# Patient Record
Sex: MALE | Race: WHITE | NOT HISPANIC OR LATINO | Employment: OTHER | URBAN - NONMETROPOLITAN AREA
[De-identification: names, ages, dates, MRNs, and addresses within clinical notes are randomized per-mention and may not be internally consistent; named-entity substitution may affect disease eponyms.]

---

## 2018-01-23 ENCOUNTER — OFFICE VISIT CONVERTED (OUTPATIENT)
Dept: FAMILY MEDICINE CLINIC | Age: 63
End: 2018-01-23
Attending: NURSE PRACTITIONER

## 2018-01-31 ENCOUNTER — OFFICE VISIT CONVERTED (OUTPATIENT)
Dept: FAMILY MEDICINE CLINIC | Age: 63
End: 2018-01-31
Attending: NURSE PRACTITIONER

## 2018-02-14 ENCOUNTER — OFFICE VISIT CONVERTED (OUTPATIENT)
Dept: FAMILY MEDICINE CLINIC | Age: 63
End: 2018-02-14
Attending: NURSE PRACTITIONER

## 2018-02-20 ENCOUNTER — OFFICE VISIT CONVERTED (OUTPATIENT)
Dept: SURGERY | Facility: CLINIC | Age: 63
End: 2018-02-20
Attending: SURGERY

## 2018-03-16 ENCOUNTER — OFFICE VISIT CONVERTED (OUTPATIENT)
Dept: SURGERY | Facility: CLINIC | Age: 63
End: 2018-03-16
Attending: SURGERY

## 2018-08-28 ENCOUNTER — OFFICE VISIT CONVERTED (OUTPATIENT)
Dept: FAMILY MEDICINE CLINIC | Age: 63
End: 2018-08-28
Attending: NURSE PRACTITIONER

## 2018-10-02 ENCOUNTER — OFFICE VISIT CONVERTED (OUTPATIENT)
Dept: FAMILY MEDICINE CLINIC | Age: 63
End: 2018-10-02
Attending: NURSE PRACTITIONER

## 2019-01-08 ENCOUNTER — OFFICE VISIT CONVERTED (OUTPATIENT)
Dept: FAMILY MEDICINE CLINIC | Age: 64
End: 2019-01-08
Attending: NURSE PRACTITIONER

## 2019-01-24 ENCOUNTER — OFFICE VISIT CONVERTED (OUTPATIENT)
Dept: FAMILY MEDICINE CLINIC | Age: 64
End: 2019-01-24
Attending: FAMILY MEDICINE

## 2019-05-14 ENCOUNTER — OFFICE VISIT CONVERTED (OUTPATIENT)
Dept: FAMILY MEDICINE CLINIC | Age: 64
End: 2019-05-14
Attending: NURSE PRACTITIONER

## 2019-05-18 ENCOUNTER — HOSPITAL ENCOUNTER (OUTPATIENT)
Dept: OTHER | Facility: HOSPITAL | Age: 64
Discharge: HOME | End: 2019-05-18
Attending: NURSE PRACTITIONER

## 2019-05-18 LAB
ALBUMIN SERPL-MCNC: 3.5 G/DL (ref 3.5–5)
ALBUMIN/GLOB SERPL: 1.1 {RATIO} (ref 1.4–2.6)
ALP SERPL-CCNC: 53 U/L (ref 56–155)
ALT SERPL-CCNC: 18 U/L (ref 10–40)
ANION GAP SERPL CALC-SCNC: 15 MMOL/L (ref 8–19)
AST SERPL-CCNC: 18 U/L (ref 15–50)
BILIRUB SERPL-MCNC: 0.62 MG/DL (ref 0.2–1.3)
BUN SERPL-MCNC: 11 MG/DL (ref 5–25)
BUN/CREAT SERPL: 14 {RATIO} (ref 6–20)
CALCIUM SERPL-MCNC: 9 MG/DL (ref 8.7–10.4)
CHLORIDE SERPL-SCNC: 105 MMOL/L (ref 99–111)
CHOLEST SERPL-MCNC: 130 MG/DL (ref 107–200)
CHOLEST/HDLC SERPL: 3 {RATIO} (ref 3–6)
CONV CO2: 24 MMOL/L (ref 22–32)
CONV TOTAL PROTEIN: 6.7 G/DL (ref 6.3–8.2)
CREAT UR-MCNC: 0.78 MG/DL (ref 0.7–1.2)
GFR SERPLBLD BASED ON 1.73 SQ M-ARVRAT: >60 ML/MIN/{1.73_M2}
GLOBULIN UR ELPH-MCNC: 3.2 G/DL (ref 2–3.5)
GLUCOSE SERPL-MCNC: 96 MG/DL (ref 70–99)
HDLC SERPL-MCNC: 44 MG/DL (ref 40–60)
LDLC SERPL CALC-MCNC: 71 MG/DL (ref 70–100)
OSMOLALITY SERPL CALC.SUM OF ELEC: 289 MOSM/KG (ref 273–304)
POTASSIUM SERPL-SCNC: 4.4 MMOL/L (ref 3.5–5.3)
SODIUM SERPL-SCNC: 140 MMOL/L (ref 135–147)
TRIGL SERPL-MCNC: 73 MG/DL (ref 40–150)
VLDLC SERPL-MCNC: 15 MG/DL (ref 5–37)

## 2019-11-26 ENCOUNTER — OFFICE VISIT CONVERTED (OUTPATIENT)
Dept: FAMILY MEDICINE CLINIC | Age: 64
End: 2019-11-26
Attending: NURSE PRACTITIONER

## 2019-11-29 ENCOUNTER — HOSPITAL ENCOUNTER (OUTPATIENT)
Dept: OTHER | Facility: HOSPITAL | Age: 64
Discharge: HOME | End: 2019-11-29
Attending: NURSE PRACTITIONER

## 2019-11-29 LAB
ALBUMIN SERPL-MCNC: 3.8 G/DL (ref 3.5–5)
ALBUMIN/GLOB SERPL: 1.1 {RATIO} (ref 1.4–2.6)
ALP SERPL-CCNC: 54 U/L (ref 56–155)
ALT SERPL-CCNC: 18 U/L (ref 10–40)
ANION GAP SERPL CALC-SCNC: 20 MMOL/L (ref 8–19)
AST SERPL-CCNC: 17 U/L (ref 15–50)
BILIRUB SERPL-MCNC: 0.28 MG/DL (ref 0.2–1.3)
BUN SERPL-MCNC: 12 MG/DL (ref 5–25)
BUN/CREAT SERPL: 16 {RATIO} (ref 6–20)
CALCIUM SERPL-MCNC: 9.6 MG/DL (ref 8.7–10.4)
CHLORIDE SERPL-SCNC: 105 MMOL/L (ref 99–111)
CHOLEST SERPL-MCNC: 138 MG/DL (ref 107–200)
CHOLEST/HDLC SERPL: 2.9 {RATIO} (ref 3–6)
CONV CO2: 18 MMOL/L (ref 22–32)
CONV TOTAL PROTEIN: 7.3 G/DL (ref 6.3–8.2)
CREAT UR-MCNC: 0.74 MG/DL (ref 0.7–1.2)
GFR SERPLBLD BASED ON 1.73 SQ M-ARVRAT: >60 ML/MIN/{1.73_M2}
GLOBULIN UR ELPH-MCNC: 3.5 G/DL (ref 2–3.5)
GLUCOSE SERPL-MCNC: 95 MG/DL (ref 70–99)
HDLC SERPL-MCNC: 48 MG/DL (ref 40–60)
LDLC SERPL CALC-MCNC: 73 MG/DL (ref 70–100)
OSMOLALITY SERPL CALC.SUM OF ELEC: 288 MOSM/KG (ref 273–304)
POTASSIUM SERPL-SCNC: 4.4 MMOL/L (ref 3.5–5.3)
PSA SERPL-MCNC: 0.19 NG/ML (ref 0–4)
SODIUM SERPL-SCNC: 139 MMOL/L (ref 135–147)
TRIGL SERPL-MCNC: 84 MG/DL (ref 40–150)
VLDLC SERPL-MCNC: 17 MG/DL (ref 5–37)

## 2020-04-06 ENCOUNTER — TELEMEDICINE CONVERTED (OUTPATIENT)
Dept: NEUROLOGY | Facility: CLINIC | Age: 65
End: 2020-04-06
Attending: NURSE PRACTITIONER

## 2020-04-20 ENCOUNTER — HOSPITAL ENCOUNTER (OUTPATIENT)
Dept: OTHER | Facility: HOSPITAL | Age: 65
Discharge: HOME | End: 2020-04-20
Attending: NURSE PRACTITIONER

## 2020-05-19 ENCOUNTER — OFFICE VISIT CONVERTED (OUTPATIENT)
Dept: NEUROLOGY | Facility: CLINIC | Age: 65
End: 2020-05-19
Attending: NURSE PRACTITIONER

## 2020-06-18 ENCOUNTER — OFFICE VISIT CONVERTED (OUTPATIENT)
Dept: FAMILY MEDICINE CLINIC | Age: 65
End: 2020-06-18
Attending: NURSE PRACTITIONER

## 2020-06-20 ENCOUNTER — HOSPITAL ENCOUNTER (OUTPATIENT)
Dept: OTHER | Facility: HOSPITAL | Age: 65
Discharge: HOME | End: 2020-06-20
Attending: NURSE PRACTITIONER

## 2020-06-20 LAB
BASOPHILS # BLD MANUAL: 0.06 10*3/UL (ref 0–0.2)
BASOPHILS NFR BLD MANUAL: 1.3 % (ref 0–3)
CHOLEST SERPL-MCNC: 114 MG/DL (ref 107–200)
CHOLEST/HDLC SERPL: 2.9 {RATIO} (ref 3–6)
DEPRECATED RDW RBC AUTO: 46.6 FL
EOSINOPHIL # BLD MANUAL: 0.21 10*3/UL (ref 0–0.7)
EOSINOPHIL NFR BLD MANUAL: 4.6 % (ref 0–7)
ERYTHROCYTE [DISTWIDTH] IN BLOOD BY AUTOMATED COUNT: 13.2 % (ref 11.5–14.5)
GRANS (ABSOLUTE): 1.69 10*3/UL (ref 2–8)
GRANS: 36.8 % (ref 30–85)
HBA1C MFR BLD: 14.1 G/DL (ref 14–18)
HCT VFR BLD AUTO: 42.2 % (ref 42–52)
HDLC SERPL-MCNC: 39 MG/DL (ref 40–60)
IMM GRANULOCYTES # BLD: 0.01 10*3/UL (ref 0–0.54)
IMM GRANULOCYTES NFR BLD: 0.2 % (ref 0–0.43)
LDLC SERPL CALC-MCNC: 64 MG/DL (ref 70–100)
LYMPHOCYTES # BLD MANUAL: 1.95 10*3/UL (ref 1–5)
LYMPHOCYTES NFR BLD MANUAL: 14.6 % (ref 3–10)
MCH RBC QN AUTO: 31.5 PG (ref 27–31)
MCHC RBC AUTO-ENTMCNC: 33.4 G/DL (ref 33–37)
MCV RBC AUTO: 94.4 FL (ref 80–96)
MONOCYTES # BLD AUTO: 0.67 10*3/UL (ref 0.2–1.2)
PLATELET # BLD AUTO: 169 10*3/UL (ref 130–400)
PMV BLD AUTO: 9 FL (ref 7.4–10.4)
RBC # BLD AUTO: 4.47 10*6/UL (ref 4.7–6.1)
TRIGL SERPL-MCNC: 54 MG/DL (ref 40–150)
VARIANT LYMPHS NFR BLD MANUAL: 42.5 % (ref 20–45)
VLDLC SERPL-MCNC: 11 MG/DL (ref 5–37)
WBC # BLD AUTO: 4.59 10*3/UL (ref 4.8–10.8)

## 2021-01-14 ENCOUNTER — OFFICE VISIT CONVERTED (OUTPATIENT)
Dept: FAMILY MEDICINE CLINIC | Age: 66
End: 2021-01-14
Attending: NURSE PRACTITIONER

## 2021-01-27 ENCOUNTER — HOSPITAL ENCOUNTER (OUTPATIENT)
Dept: OTHER | Facility: HOSPITAL | Age: 66
Discharge: HOME | End: 2021-01-27
Attending: NURSE PRACTITIONER

## 2021-01-27 ENCOUNTER — HOSPITAL ENCOUNTER (OUTPATIENT)
Dept: CARDIOLOGY | Facility: HOSPITAL | Age: 66
Discharge: HOME | End: 2021-01-27
Attending: SURGERY

## 2021-01-27 LAB
ALBUMIN SERPL-MCNC: 3.5 G/DL (ref 3.5–5)
ALBUMIN/GLOB SERPL: 1 {RATIO} (ref 1.4–2.6)
ALP SERPL-CCNC: 60 U/L (ref 56–155)
ALT SERPL-CCNC: 23 U/L (ref 10–40)
ANION GAP SERPL CALC-SCNC: 13 MMOL/L (ref 8–19)
AST SERPL-CCNC: 22 U/L (ref 15–50)
BILIRUB SERPL-MCNC: 0.34 MG/DL (ref 0.2–1.3)
BUN SERPL-MCNC: 10 MG/DL (ref 5–25)
BUN/CREAT SERPL: 11 {RATIO} (ref 6–20)
CALCIUM SERPL-MCNC: 9 MG/DL (ref 8.7–10.4)
CHLORIDE SERPL-SCNC: 104 MMOL/L (ref 99–111)
CHOLEST SERPL-MCNC: 147 MG/DL (ref 107–200)
CHOLEST/HDLC SERPL: 3.2 {RATIO} (ref 3–6)
CONV CO2: 24 MMOL/L (ref 22–32)
CONV TOTAL PROTEIN: 6.9 G/DL (ref 6.3–8.2)
CREAT UR-MCNC: 0.91 MG/DL (ref 0.7–1.2)
ERYTHROCYTE [DISTWIDTH] IN BLOOD BY AUTOMATED COUNT: 12.5 % (ref 11.5–14.5)
GFR SERPLBLD BASED ON 1.73 SQ M-ARVRAT: >60 ML/MIN/{1.73_M2}
GLOBULIN UR ELPH-MCNC: 3.4 G/DL (ref 2–3.5)
GLUCOSE SERPL-MCNC: 129 MG/DL (ref 70–99)
HBA1C MFR BLD: 15.5 G/DL (ref 14–18)
HCT VFR BLD AUTO: 46.1 % (ref 42–52)
HDLC SERPL-MCNC: 46 MG/DL (ref 40–60)
LDLC SERPL CALC-MCNC: 76 MG/DL (ref 70–100)
MCH RBC QN AUTO: 32 PG (ref 27–31)
MCHC RBC AUTO-ENTMCNC: 33.6 G/DL (ref 33–37)
MCV RBC AUTO: 95.1 FL (ref 80–96)
OSMOLALITY SERPL CALC.SUM OF ELEC: 285 MOSM/KG (ref 273–304)
PLATELET # BLD AUTO: 207 10*3/UL (ref 130–400)
PMV BLD AUTO: 9 FL (ref 7.4–10.4)
POTASSIUM SERPL-SCNC: 4.2 MMOL/L (ref 3.5–5.3)
RBC # BLD AUTO: 4.85 10*6/UL (ref 4.7–6.1)
SODIUM SERPL-SCNC: 137 MMOL/L (ref 135–147)
TRIGL SERPL-MCNC: 127 MG/DL (ref 40–150)
VLDLC SERPL-MCNC: 25 MG/DL (ref 5–37)
WBC # BLD AUTO: 6.97 10*3/UL (ref 4.8–10.8)

## 2021-01-29 LAB
EST. AVERAGE GLUCOSE BLD GHB EST-MCNC: 126 MG/DL
HBA1C MFR BLD: 6 % (ref 3.5–5.7)

## 2021-05-10 NOTE — H&P
"   History and Physical      Patient Name: Yemi Pabon   Patient ID: [id number]   Sex: Male   YOB: 1955    Primary Care Provider: Mervin Rodriguez MD   Referring Provider: Jyoti FOX    Visit Date: April 6, 2020    Provider: DOMINIQUE Louis   Location: [facility]   Location Address: [address] [place] [address]  [id number]   Location Phone: [phone]          Chief Complaint     hx of stroke       History Of Present Illness  Video Conferencing Visit  Yemi Pabon is a 64 year old /White male who is presenting for evaluation via video conferencing. Verbal consent obtained before beginning visit.   The following staff were present during this visit: Lizett Charles and Janae CERVANTES   Yemi Pabon is a 64 year old /White male who presents today to [facility] today referred from Jyoti FOX. Had a spell at work 3 weeks ago. [facility] and was referred to ER. Had pain in the top part of his head that lasted a little over 2 minutes, had some blurred vision, felt weak and had to sit down, hands were sweaty. Felt back to normal within 2 hours. Felt a numb feeling to head. Continues to have a \"fuzzy feeling\" in the top of his head. States other than that he is feeling good and back to normal.      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       Past Medical History  Blood clot in vein         Past Surgical History  Colonoscopy; EGD         Medication List  aspirin 81 mg oral tablet,delayed release (DR/EC); Claritin 10 mg oral tablet; fexofenadine 180 mg oral tablet; Lipitor 10 mg oral tablet; omeprazole 40 mg oral capsule,delayed release(DR/EC)         Allergy List  finasteride; Neosporin; Peroxide; phenobarbital; SULFA (SULFONAMIDES)         Family Medical " History  Coronary Artery Disease; Diabetes         Social History  ; Tobacco (Never)         Review of Systems  · Constitutional  o Denies  o : chills, excessive sweating, fatigue, fever, sycope/passing out, weight gain, weight loss  · Eyes  o Denies  o : changes in vision, blurry vision, double vision  · HENT  o Denies  o : loss of hearing, ringing in the ears, ear aches, sore throat, nasal congestion, sinus pain, nose bleeds, seasonal allergies  · Cardiovascular  o Denies  o : blood clots, swollen legs, anemia, easy burising or bleeding, transfusions  · Respiratory  o Denies  o : shortness of breath, dry cough, productive cough, pneumonia, COPD  · Gastrointestinal  o Denies  o : difficulty swallowing, reflux  · Genitourinary  o Denies  o : incontinence  · Neurologic  o Admits  o : stroke  o Denies  o : headache, seizure, tremor, loss of balance, falls, dizziness/vertigo, difficulty with sleep, numbness/tingling/paresthesia , difficulty with coordination, difficulty with dexterity, weakness  · Musculoskeletal  o Denies  o : neck stiffness/pain, swollen lymph nodes, muscle aches, joint pain, weakness, spasms, sciatica, pain radiating in arm, pain radiating in leg, low back pain  · Endocrine  o Denies  o : diabetes, thyroid disorder  · Psychiatric  o Denies  o : anxiety, depression      Physical Examination  · Constitutional  o Appearance  o : well-nourished, well groomed, in no apparent distress  · Eyes  o Pupils and Irises  o : Pupils equal, round  · Cardiovascular  o Peripheral Vascular System  o :   § Extremities  § : No peripheral edema was appreciated  · Musculoskeletal  o General  o : Normal bulk and normal tone throughout. Normal gait and station.  · Neurologic  o Mental Status Examination  o :   § Orientation  § : Alert and oriented to person, place, and time,  § Speech/Language  § : Intact naming, comprehension, and repetition. No dysarthria.  § Memory  § : Immediate recall is 3/3. Recall at 5  minutes is 3/3.   § Attention  § : Attention span is intact to serial 7 examination and finger tapping.   § Fund of Knowledge  § : Adequate fund of knowledge  o Cranial Nerves  o : Pupils are equal, round and reactive to light. Extraocular movements are intact. Visual fields are full. Sensation in the V1-V3 distribution is intact and symmetric. Muscles of mastication are strong and symmetric. Muscles of facial expression are strong and symmetric. Hearing is intact. Palatal raise is intact and symmetric. Uvula is midline. Shoulder shrug is strong. Tongue protrudes in the midline.  o Sensation  o : Intact sensation to light touch  o Gait and Station  o :   § Gait Screening  § : Normal, narrow based gait, with a normal arm swing.  o Coordination  o : Intact finger to nose and heel to shin. Rapid alternating movements are intact in the upper and lower extremities.          Assessment  · TIA (transient ischemic attack)     435.9/G45.9  Will recheck CT head due to him receiving initial CT head within the first 12 hours of symptom onset. He is unable to have MRI. Will rule out CVA with repeat CT head. Continue Lipitor and aspirin for vascular risk reduction. Reviewed CTA head/neck, CT head, echo results. All unremarkable. Discussed signs and symptoms of stroke and the importance of going to the ER immediately at the onset of those symptoms.     Problems Reconciled  Plan  · Orders  o CT Head/Brain without IV Contrast [facility] ([id number]) - 435.9/G45.9 - 04/06/2020  · Medications  o Medications have been Reconciled  o Transition of Care or Provider Policy  · Instructions  o Encouraged to follow-up with Primary Care Provider for preventative care.  o Call or Return if symptoms worsen or persist.   o Follow Up in 2 weeks.            Electronically Signed by: DOMINIQUE Louis -Author on April 6, 2020 04:16:00 PM

## 2021-05-13 NOTE — PROGRESS NOTES
"   Progress Note      Patient Name: Yemi Pabon   Patient ID: [id number]   Sex: Male   YOB: 1955    Primary Care Provider: Mervin Rodriguez MD   Referring Provider: Jyoti FOX    Visit Date: May 19, 2020    Provider: DOMINIQUE Louis   Location: [facility]   Location Address: [address] [place] [address]  [id number]   Location Phone: [phone]          Chief Complaint  · Follow Up Exam      History Of Present Illness  Video Conferencing Visit  Yemi Pabon is a 64 year old /White male who is presenting for evaluation via video conferencing. Verbal consent obtained before beginning visit.   Yemi Pabon is a 64 year old /White male who presents today to [facility] today referred from Jyoti FOX. Had a spell at work 3 weeks ago. Went to [facility] in clinic and was referred to ER. Had pain in the top part of his head that lasted a little over 2 minutes, had some blurred vision, felt weak and had to sit down, hands were sweaty. Felt back to normal within 2 hours. Felt a numb feeling to head. Continues to have a \"fuzzy feeling\" in the top of his head. States other than that he is feeling good and back to normal.   Yemi Pabon is a 64 year old /White male who presents today to [facility] today for a follow up exam. Denies recurrent stroke symptoms. States he has occipital headache when he keeps his head bent over too much. 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 Repeat CT head is unrevealing for stroke.      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       Past Medical History  Blood clot in vein         Past Surgical History  Colonoscopy; EGD         Medication List  aspirin 81 mg oral tablet,delayed release (DR/EC); Claritin 10 mg oral " "tablet; fexofenadine 180 mg oral tablet; Lipitor 10 mg oral tablet; omeprazole 40 mg oral capsule,delayed release(DR/EC)         Allergy List  finasteride; Neosporin; Peroxide; phenobarbital; SULFA (SULFONAMIDES)         Family Medical History  Coronary Artery Disease; Diabetes         Social History  ; Tobacco (Never)         Review of Systems  · Constitutional  o Denies  o : chills, excessive sweating, fatigue, fever, sycope/passing out, weight gain, weight loss  · Eyes  o Denies  o : changes in vision, blurry vision, double vision  · HENT  o Denies  o : loss of hearing, ringing in the ears, ear aches, sore throat, nasal congestion, sinus pain, nose bleeds, seasonal allergies  · Cardiovascular  o Denies  o : blood clots, swollen legs, anemia, easy burising or bleeding, transfusions  · Respiratory  o Denies  o : shortness of breath, dry cough, productive cough, pneumonia, COPD  · Gastrointestinal  o Denies  o : difficulty swallowing, reflux  · Genitourinary  o Denies  o : incontinence  · Neurologic  o Denies  o : headache, seizure, stroke, tremor, loss of balance, falls, dizziness/vertigo, difficulty with sleep, numbness/tingling/paresthesia , difficulty with coordination, difficulty with dexterity, weakness  · Musculoskeletal  o Denies  o : neck stiffness/pain, swollen lymph nodes, muscle aches, joint pain, weakness, spasms, sciatica, pain radiating in arm, pain radiating in leg, low back pain  · Endocrine  o Denies  o : diabetes, thyroid disorder  · Psychiatric  o Denies  o : anxiety, depression      Vitals  Date Time BP Position Site L\R Cuff Size HR RR TEMP (F) WT  HT  BMI kg/m2 BSA m2 O2 Sat        05/19/2020 02:39 /63 Sitting    76 - R  98 279lbs 8oz 5'  11\" 38.98 2.52           Physical Examination  · Constitutional  o Appearance  o : well-nourished, well groomed, in no apparent distress  · Eyes  o Pupils and Irises  o : Pupils equal, round  · Cardiovascular  o Peripheral Vascular System  o : "   § Extremities  § : No peripheral edema was appreciated  · Musculoskeletal  o General  o : Normal bulk and normal tone throughout. Normal gait and station.  · Neurologic  o Mental Status Examination  o :   § Orientation  § : Alert and oriented to person, place, and time,  § Speech/Language  § : Intact naming, comprehension, and repetition. No dysarthria.  § Memory  § : Immediate recall is 3/3. Recall at 5 minutes is 3/3.   § Attention  § : Attention span is intact to serial 7 examination and finger tapping.   § Fund of Knowledge  § : Adequate fund of knowledge  o Cranial Nerves  o : Pupils are equal, round and reactive to light. Extraocular movements are intact. Visual fields are full. Sensation in the V1-V3 distribution is intact and symmetric. Muscles of mastication are strong and symmetric. Muscles of facial expression are strong and symmetric. Hearing is intact. Palatal raise is intact and symmetric. Uvula is midline. Shoulder shrug is strong. Tongue protrudes in the midline.  o Motor Examination  o :   § RUE Strength  § : strength normal  § LUE Strength  § : strength normal  § RLE Strength  § : strength normal  § LLE Strength  § : strength normal  o Reflexes  o : diffuse hyporeflexia present   o Sensation  o : Intact sensation to light touch  o Gait and Station  o :   § Gait Screening  § : Normal, narrow based gait, with a normal arm swing.  o Cerebellar Function  o : intact finger to nose and heel to shin. Rapid alternating movements are intact in the upper and lower extremities.   o Coordination  o : Intact finger to nose and heel to shin. Rapid alternating movements are intact in the upper and lower extremities.          Assessment  · TIA (transient ischemic attack)     435.9/G45.9  Continue Lipitor and aspirin for vascular risk reduction. Reviewed repeat CT head results, no stroke noted. Discussed signs and symptoms of stroke and the importance of going to the ER immediately at the onset of those symptoms.      Problems Reconciled  Plan  · Medications  o Medications have been Reconciled  o Transition of Care or Provider Policy  · Instructions  o Call or Return if symptoms worsen or persist.   o Follow up in 6 months.            Electronically Signed by: DOMINIQUE Louis -Author on May 20, 2020 01:54:08 PM

## 2021-05-15 VITALS
WEIGHT: 279.5 LBS | HEIGHT: 71 IN | DIASTOLIC BLOOD PRESSURE: 63 MMHG | BODY MASS INDEX: 39.13 KG/M2 | TEMPERATURE: 98 F | SYSTOLIC BLOOD PRESSURE: 143 MMHG | HEART RATE: 76 BPM

## 2021-05-16 VITALS — WEIGHT: 260 LBS | BODY MASS INDEX: 36.4 KG/M2 | HEIGHT: 71 IN | RESPIRATION RATE: 16 BRPM

## 2021-05-16 VITALS — BODY MASS INDEX: 36.89 KG/M2 | WEIGHT: 263.5 LBS | HEIGHT: 71 IN

## 2021-05-18 NOTE — PROGRESS NOTES
Yemi Pabon PAMELA 1955     Office/Outpatient Visit    Visit Date: Tue, Jan 23, 2018 09:53 am    Provider: Vahe Jones N.P. (Assistant: Belinda Mcmillan MA)    Location: [facility]        Electronically signed by Vahe Jones N.P. on  01/23/2018 10:42:21 AM                             SUBJECTIVE:        CC:     Fabian is a 62 year old White male.  Sinuses         HPI:         Acute sinusitis, other noted.  This has been a problem for the past 3 days.  The pattern of symptoms is described as episodic, with unpredictable symptomatic periods.  His primary symptoms include facial pressure, subjective fever,  headache, nasal congestion and post-nasal drip.  He denies chest congestion or cough.  He has already tried an antihistamine and guaifenesin.  Medical history is pertinent for allergies and frequent sinusitis.  teeth pain and foul nasal odor     ROS:     CONSTITUTIONAL:  Negative for chills, fatigue, fever and weight change.      E/N/T:  Positive for nasal congestion and sinus pressure.   Negative for sore throat.      CARDIOVASCULAR:  Negative for chest pain, orthopnea, paroxysmal nocturnal dyspnea and pedal edema.      RESPIRATORY:  Negative for dyspnea and cough.      GASTROINTESTINAL:  Negative for abdominal pain, heartburn, constipation, diarrhea, and stool changes.          PMH/FMH/SH:     Last Reviewed on 1/23/2018 10:41 AM by Vahe Jones    Past Medical History:         Fracture(s): left foot fx;         PREVENTIVE HEALTH MAINTENANCE             COLONOSCOPY: Done within the last 10 years was last done 1/2008 with normal results The next one is due  at age 65     INFLUENZA VACCINE: declines, understands reason for immunization         Surgical History:     NONE         Family History:         Positive for Coronary Artery Disease ( father ).      Positive for Type 2 Diabetes ( mother ).          Social History:     Occupation: a factory     Marital Status:       Children: 3 children     ALONSO denies any current form of exercise.          Tobacco/Alcohol/Supplements:     Last Reviewed on 1/23/2018 10:41 AM by Vahe Jones    Tobacco: He has never smoked.  Non-drinker     Caffeine:  He admits to consuming caffeine via coffee ( 3 servings per day ) and tea ( 1 serving per day ).              Current Problems:     Last Reviewed on 1/23/2018 10:41 AM by Vahe Jones    GERD     Hypercholesterolemia     Fatigue     History of pulmonary embolism     Thyromegaly     Acute sinusitis, other     Upper respiratory infection         Immunizations:     Influenza Virus Vaccine, unspecified formulation 2/14/2017         Allergies:     Last Reviewed on 1/23/2018 10:41 AM by Vahe Jones    Sulfonamides:    Phenobarbital:    Neosporin:    hydrogen peroxide:        Current Medications:     Last Reviewed on 1/23/2018 10:41 AM by Vahe Jones    Allegra Allergy 24 Hour 180mg Tablet Take 1 tablet(s) by mouth daily     Lipitor 10mg Tablet Take 1 tablet(s) by mouth daily     Omeprazole 40mg Capsules, Extended Release Take 1 capsule(s) by mouth daily     Aspirin (ASA) 81mg Tablets, Enteric Coated 1 tab daily     Erythromycin Base 333mg Tablets, Enteric Coated Take 1 tablet(s) by mouth q8h         OBJECTIVE:        Vitals:         Current: 1/23/2018 9:56:21 AM    Ht:  5 ft, 10.75 in;  Wt: 256 lbs;  BMI: 36.0    T: 98.3 F (oral);  BP: 144/96 mm Hg (left arm, sitting);  P: 86 bpm (left arm (BP Cuff), sitting);  sCr: 0.89 mg/dL;  GFR: 97.71        Repeat:     10:18:22 AM     BP:   135/85mm Hg (left arm, sitting)         Exams:     PHYSICAL EXAM:     GENERAL: Vitals recorded well developed, well nourished;  well groomed;  no apparent distress;     E/N/T: NOSE: bilateral maxillary sinus tenderness present;     RESPIRATORY: normal respiratory rate and pattern with no distress; normal breath sounds with no rales, rhonchi, wheezes or rubs;     CARDIOVASCULAR: normal rate; rhythm is  regular;  normal S1; normal S2; no systolic murmur; no cyanosis; no edema;     GASTROINTESTINAL: nontender, nondistended; no hepatosplenomegaly or masses; no bruits;         ASSESSMENT:           461.8   J01.90  Acute sinusitis, other              DDx:         ORDERS:         Meds Prescribed:       Fluticasone Propionate 50mcg/1actuation Nasal Spray 1 spray each nostil daily  #1 (One) gm Refills: 2       Refill of: Erythromycin Base 333mg Tablets, Enteric Coated Take 1 tablet(s) by mouth q8h  #30 (Thirty) tablet(s) Refills: 0                 PLAN:          Acute sinusitis, other         FOLLOW-UP: Advised to call if there is no improvement Follow-up by phone if no improvement in 1 week..   Schedule follow-up appointments on a p.r.n. basis..            Prescriptions:       Fluticasone Propionate 50mcg/1actuation Nasal Spray 1 spray each nostil daily  #1 (One) gm Refills: 2       Refill of: Erythromycin Base 333mg Tablets, Enteric Coated Take 1 tablet(s) by mouth q8h  #30 (Thirty) tablet(s) Refills: 0             Patient Recommendations:        For  Acute sinusitis, other:     Follow-up by phone if no improvement in 1 week. Schedule follow-up appointments as needed.                APPOINTMENT INFORMATION:        Monday Tuesday Wednesday Thursday Friday Saturday Sunday            Time:___________________AM  PM   Date:_____________________             CHARGE CAPTURE:           Primary Diagnosis:     461.8 Acute sinusitis, other            J01.90    Acute sinusitis, unspecified              Orders:          [id number]   Office/outpatient visit; established patient, level 3  (In-House)

## 2021-05-18 NOTE — PROGRESS NOTES
Yemi Pabon 1955     Office/Outpatient Visit    Visit Date: Tue, Aug 28, 2018 05:51 pm    Provider: Jyoti Mcduffie N.P. (Assistant: Sarah Spurling, MA)    Location: [facility]        Electronically signed by Jyoti Mcduffie N.P. on  08/29/2018 09:39:17 PM                             SUBJECTIVE:        CC:     Fabian is a 62 year old White male.  This is a follow-up visit.          HPI:         Patient to be evaluated for hypercholesterolemia.  Current treatment includes Lipitor and a low cholesterol/low fat diet.  Compliance with treatment has been good; he takes his medication as directed.  He denies experiencing any hypercholesterolemia related symptoms.          he states bp is consistently 130s over 80s or lower.          GERD details; stable on omeprazole.  denies side effects.  requests refills.  EGD feb 2018- erosive gastritis.  feels that removing stomach polyp improved symptoms.      ROS:     CONSTITUTIONAL:  Negative for chills, fatigue, fever, and weight change.      CARDIOVASCULAR:  Negative for chest pain, palpitations, tachycardia, orthopnea, and edema.      RESPIRATORY:  Negative for cough, dyspnea, and hemoptysis.      GASTROINTESTINAL:  Positive for acid reflux symptoms ( stable ).   Negative for constipation, diarrhea, hematochezia, nausea or vomiting.      GENITOURINARY:  Negative for dysuria, genital lesions, hematuria, impotence, polyuria, and changes in urine stream.      MUSCULOSKELETAL:  Negative for arthralgias, back pain, and myalgias.      NEUROLOGICAL:  Negative for dizziness, headaches, paresthesias, and weakness.      ENDOCRINE:  Negative for hair loss, heat/cold intolerance, polydipsia, and polyphagia.      PSYCHIATRIC:  Negative for anxiety, depression, and sleep disturbances.          PMH/FMH/SH:     Last Reviewed on 1/23/2018 10:41 AM by Vahe Jones    Past Medical History:         Fracture(s): left foot fx;         PREVENTIVE HEALTH MAINTENANCE          EGD- feb 2018- stomach polyps , erosive gastritis     COLORECTAL CANCER SCREENING: Up to date (colonoscopy q10y; sigmoidoscopy q5y; Cologuard q3y) was last done 03/2018, Results are in chart; colonoscopy with normal results         Surgical History:     NONE         Family History:         Positive for Coronary Artery Disease ( father ).      Positive for Type 2 Diabetes ( mother ).          Social History:     Occupation: a factory     Marital Status:      Children: 3 children     ALONSO denies any current form of exercise.          Tobacco/Alcohol/Supplements:     Last Reviewed on 2/14/2018 03:07 PM by Cristina Perez    Tobacco: He has never smoked.  Non-drinker     Caffeine:  He admits to consuming caffeine via coffee ( 3 servings per day ) and tea ( 1 serving per day ).          Substance Abuse History:     Last Reviewed on 1/23/2018 10:41 AM by Vahe Jones    NEGATIVE         Mental Health History:     Last Reviewed on 1/23/2018 10:41 AM by Vahe Jones        Communicable Diseases (eg STDs):     Last Reviewed on 1/23/2018 10:41 AM by Vahe Jones            Current Problems:     Last Reviewed on 1/23/2018 10:41 AM by Vahe Jones    Vomiting     GERD     Hypercholesterolemia     Fatigue     History of pulmonary embolism     Thyromegaly         Immunizations:     Influenza Virus Vaccine, unspecified formulation 2/14/2017         Allergies:     Last Reviewed on 2/14/2018 03:07 PM by Cristina Perez    Sulfonamides:    Phenobarbital:    Neosporin:    hydrogen peroxide:        Current Medications:     Last Reviewed on 8/28/2018 05:57 PM by Spurling, Sarah C    Lipitor 10mg Tablet Take 1 tablet(s) by mouth daily     Allegra Allergy 24 Hour 180mg Tablet Take 1 tablet(s) by mouth daily     Omeprazole 40mg Capsules, Extended Release Take 1 capsule(s) by mouth daily     Aspirin (ASA) 81mg Tablets, Enteric Coated 1 tab daily         OBJECTIVE:        Vitals:         Historical:      02/14/2018  BP:   130/72 mm Hg ( (left arm, , sitting, );)     02/14/2018  Wt:   256.1lbs        Current: 8/28/2018 5:59:59 PM    Ht:  5 ft, 10.75 in;  Wt: 270.8 lbs;  BMI: 38.0    T: 98.3 F (oral);  BP: 181/82 mm Hg (right arm, sitting);  P: 86 bpm (right arm (BP Cuff), sitting);  sCr: 0.65 mg/dL;  GFR: 137.03        Repeat:     6:32:01 PM     BP:   138/80mm Hg (left arm, sitting)         Exams:     PHYSICAL EXAM:     GENERAL: no apparent distress;     NECK: thyroid is non-palpable;     RESPIRATORY: normal respiratory rate and pattern with no distress; normal breath sounds with no rales, rhonchi, wheezes or rubs;     CARDIOVASCULAR: normal rate; rhythm is regular;     MUSCULOSKELETAL:  Normal range of motion, strength and tone;     NEUROLOGIC: mental status: alert and oriented x 3; GROSSLY INTACT     PSYCHIATRIC:  appropriate affect and demeanor; normal speech pattern; grossly normal memory;         ASSESSMENT           272.0   E78.4  Hypercholesterolemia              DDx:     796.2   R03.0  Blood pressure elevation without a diagnosis of HTN              DDx:     530.81   K21.9  GERD              DDx:     477.9   J30.1  Allergies              DDx:         ORDERS:         Meds Prescribed:       Refill of: Omeprazole 40mg Capsules, Extended Release Take 1 capsule(s) by mouth daily  #90 (Ninety) capsule(s) Refills: 1       Refill of: Lipitor (Atorvastatin Calcium) 10mg Tablet Take 1 tablet(s) by mouth daily  #90 (Ninety) tablet(s) Refills: 1       Refill of: Allegra Allergy 24 Hour (Fexofenadine HCl) 180mg Tablet Take 1 tablet(s) by mouth daily  #90 (Ninety) tablet(s) Refills: 1         Lab Orders:       [id number]  HTN - [facility] CMP AND LIPID: [id number]  (Send-Out)                   PLAN: [facility] 2016 ENT dr echavarria 2007          Hypercholesterolemia     LABORATORY:  Labs ordered to be performed today include HTN/Lipid Panel: CMP, Lipid.      RECOMMENDATIONS given include: exercise and low cholesterol/low fat diet.             Prescriptions:       Refill of: Lipitor (Atorvastatin Calcium) 10mg Tablet Take 1 tablet(s) by mouth daily  #90 (Ninety) tablet(s) Refills: 1           Orders:       [id number]  HTN - [facility] CMP AND LIPID: [id number]  (Send-Out)            Blood pressure elevation without a diagnosis of HTN         RECOMMENDATIONS given include: monitor bp at home.  follow up for readings 140/90 or higher..           GERD         RECOMMENDATIONS given include: discontinuation of NSAIDs and aspirin, discontinuation (or at least limitation) of alcohol, avoidance of spicy foods, monitor kidney function., and reduce caffiene.            Prescriptions:       Refill of: Omeprazole 40mg Capsules, Extended Release Take 1 capsule(s) by mouth daily  #90 (Ninety) capsule(s) Refills: 1          Allergies           Prescriptions:       Refill of: Allegra Allergy 24 Hour (Fexofenadine HCl) 180mg Tablet Take 1 tablet(s) by mouth daily  #90 (Ninety) tablet(s) Refills: 1             Patient Recommendations:        For  Hypercholesterolemia:     Maintain a regular exercise program. Reduce the amount of cholesterol and saturated fat in your diet.          For  GERD:     Don't take aspirin or anti-inflammatory medications such as ibuprofen and naproxen, as these will further irritate your stomach.  (Tylenol is okay.) Avoid alcohol. Avoid spicy foods if they tend to make your symptoms worse.              CHARGE CAPTURE           **Please note: ICD descriptions below are intended for billing purposes only and may not represent clinical diagnoses**        Primary Diagnosis:         272.0 Hypercholesterolemia            E78.4    Other hyperlipidemia              Orders:          [id number]   Office/outpatient visit; established patient, level 3  (In-House)           796.2 Blood pressure elevation without a diagnosis of HTN            R03.0    Elevated blood-pressure reading, without diagnosis of hypertension    530.81 GERD            K21.9    Gastro-esophageal  reflux disease without esophagitis    477.9 Allergies            J30.1    Allergic rhinitis due to pollen

## 2021-05-18 NOTE — PROGRESS NOTES
Yemi PabonNavneet 1955     Office/Outpatient Visit    Visit Date: Tue, Jan 8, 2019 02:42 pm    Provider: Belia Vera N.P. (Assistant: Trish Rodriguez MA)    Location: [facility]        Electronically signed by Belia Vera N.P. on  01/09/2019 09:08:56 AM                             SUBJECTIVE:        CC: Pt also seen by COLTON Warren NP Student     Fabian is a 63 year old White male.  presents today due to complaints of drainage, cough, sinus pressure X 4 days         HPI:         Acute upper respiratory infection of multiple sites noted.  Reports L sinus pain and pressure that has worsened since Sunday;  States he has been taking his allergy medication daily in addition to a decongestant; however, he reports symptoms are worsening;     ROS:     CONSTITUTIONAL:  Negative for chills, fatigue, fever and weight change.      E/N/T:  Positive for ear pain, nasal congestion, frequent rhinorrhea, sinus pressure and sore throat ( acute ).      CARDIOVASCULAR:  Negative for chest pain, orthopnea, paroxysmal nocturnal dyspnea and pedal edema.      RESPIRATORY:  Negative for dyspnea and cough.      GASTROINTESTINAL:  Negative for abdominal pain, heartburn, constipation, diarrhea, and stool changes.      PSYCHIATRIC:  Negative for anxiety and depression.          PMH/FMH/SH:     Last Reviewed on 1/08/2019 03:20 PM by Belia Vera    Past Medical History:             PAST MEDICAL HISTORY     normal heart echo 2017     Fracture(s): left foot fx;     Hospitalizations: bilateral PE, 5-31-16 [facility] MAINTENANCE         EGD- feb 2018- stomach polyps , erosive gastritis     COLORECTAL CANCER SCREENING: Up to date (colonoscopy q10y; sigmoidoscopy q5y; Cologuard q3y) was last done 03/2018, Results are in chart; colonoscopy with normal results         Surgical History:     NONE         Family History:         Positive for Coronary Artery Disease ( father ).      Positive for Type 2  Diabetes ( mother ).          Social History:     Occupation: a factory     Marital Status:      Children: 3 children     ALONSO denies any current form of exercise.          Tobacco/Alcohol/Supplements:     Last Reviewed on 1/08/2019 03:20 PM by Belia Vera    Tobacco: He has never smoked.  Non-drinker     Caffeine:  He admits to consuming caffeine via coffee ( 3 servings per day ) and tea ( 1 serving per day ).          Substance Abuse History:     Last Reviewed on 1/08/2019 03:20 PM by Belia Vera    NEGATIVE         Mental Health History:     Last Reviewed on 1/08/2019 03:20 PM by Belia Vera        Communicable Diseases (eg STDs):     Last Reviewed on 1/08/2019 03:20 PM by Belia Vera            Current Problems:     Last Reviewed on 1/08/2019 03:20 PM by Belia Vera    Vomiting     GERD     Hypercholesterolemia     Fatigue     History of pulmonary embolism     Thyromegaly     Acute frontal sinusitis         Immunizations:     Influenza Virus Vaccine, unspecified formulation 2/14/2017     Tdap (Tetanus, reduced diph, acellular pertussis) 9/24/2018         Allergies:     Last Reviewed on 1/08/2019 03:20 PM by Belia Vera    Sulfonamides:    Phenobarbital:    Neosporin:        Current Medications:     Last Reviewed on 1/08/2019 03:20 PM by Belia Vera    Allegra Allergy 24 Hour 180mg Tablet Take 1 tablet(s) by mouth daily     Lipitor 10mg Tablet Take 1 tablet(s) by mouth daily     Omeprazole 40mg Capsules, Extended Release Take 1 capsule(s) by mouth daily     Aspirin (ASA) 81mg Tablets, Enteric Coated 1 tab daily         OBJECTIVE:        Vitals:         Current: 1/8/2019 2:48:15 PM    Ht:  5 ft, 10.75 in;  Wt: 269.6 lbs;  BMI: 37.9    T: 98.1 F (oral);  BP: 164/74 mm Hg (left arm, sitting);  P: 78 bpm (right arm (BP Cuff), sitting);  sCr: 0.75 mg/dL;  GFR: 117.06        Exams:     PHYSICAL EXAM:     GENERAL: Vitals recorded well developed, well nourished;  well  groomed;  no apparent distress;     E/N/T: EARS: external auditory canal normal on the left and occluded by cerumen on the right;  NOSE: turbinates are mildly swollen on the left;  left maxillary and bilateral frontal sinus tenderness present; OROPHARYNX: oral mucosa is normal;     RESPIRATORY: normal respiratory rate and pattern with no distress; normal breath sounds with no rales, rhonchi, wheezes or rubs;     CARDIOVASCULAR: normal rate; rhythm is regular;  normal S1; normal S2; no systolic murmur; no cyanosis; no edema;     GASTROINTESTINAL: nontender, nondistended; no hepatosplenomegaly or masses; no bruits;     SKIN:     MUSCULOSKELETAL:  Normal range of motion, strength and tone;     NEUROLOGICAL:  cranial nerves, motor and sensory function, reflexes, gait and coordination are all intact;     PSYCHIATRIC:  appropriate affect and demeanor; normal speech pattern; grossly normal memory;         ASSESSMENT:           461.1   J01.10  Acute frontal sinusitis              DDx:         ORDERS:         Meds Prescribed:       Augmentin (Amoxicillin/Clavulanate) 875mg/125mg Tablet 1 tab bid X 10 days  #20 (Twenty) tablet(s) Refills: 0                 PLAN:          Acute frontal sinusitis         FOLLOW-UP: Advised to call if there is no improvement..   for Annual Checkup School/Work Excuse for Today           Prescriptions:       Augmentin (Amoxicillin/Clavulanate) 875mg/125mg Tablet 1 tab bid X 10 days  #20 (Twenty) tablet(s) Refills: 0           Patient Education Handouts:       Sinus Infection (Sinusitis)              Patient Recommendations:        For  Acute frontal sinusitis:                     APPOINTMENT INFORMATION:        Monday Tuesday Wednesday Thursday Friday Saturday Sunday            Time:___________________AM  PM   Date:_____________________             CHARGE CAPTURE:           Primary Diagnosis:     461.1 Acute frontal sinusitis            J01.10    Acute frontal sinusitis, unspecified               Orders:          [id number]   Office/outpatient visit; established patient, level 3  (In-House)

## 2021-05-18 NOTE — PROGRESS NOTES
Yemi Pabon  1955     Office/Outpatient Visit    Visit Date: Thu, Jun 18, 2020 04:26 pm    Provider: Jyoti Mcduffie N.P. (Assistant: Spurling, Sarah C, MA)    Location: [facility]        Electronically signed by Jyoti Mcduffie N.P. on  06/30/2020 09:32:17 PM                             Subjective:        CC: Fabian is a 64 year old White male.  This is a follow-up visit.          HPI:           In regard to the gERD, gERD details; stable on omeprazole.  denies side effects.  requests refills.  EGD feb 2018- erosive gastritis.  feels that removing stomach polyp improved symptoms.            Additionally, he presents with history of mixed hyperlipidemia.  with regard to the hypercholesterolemia, current treatment includes Lipitor and a low cholesterol/low fat diet.  Compliance with treatment has been good; he takes his medication as directed.  He denies experiencing any hypercholesterolemia related symptoms.            wbc 14.6 at [facility] 3- when seen for dizziness.  dx with TIA.  ct and cta not concerning.  unable to have MRI due to many yrs as .  repeat ct of head per dr wright showed no concerns per pt.  he states he feels well and has had no further concerns.      ROS:     CONSTITUTIONAL:  Negative for chills, fatigue, fever, and weight change.      CARDIOVASCULAR:  Positive for pedal edema.   Negative for chest pain, dizziness or palpitations.      RESPIRATORY:  Negative for cough, dyspnea, and hemoptysis.      GASTROINTESTINAL:  Negative for abdominal pain, heartburn, constipation, diarrhea, and stool changes.      GENITOURINARY:  Negative for dysuria, genital lesions, hematuria, impotence, polyuria, and changes in urine stream.      MUSCULOSKELETAL:  Negative for arthralgias, limb pain and myalgias.      NEUROLOGICAL:  Negative for dizziness, headaches, paresthesias, and weakness.      ENDOCRINE:  Negative for hair loss, heat/cold intolerance, polydipsia, and  polyphagia.      PSYCHIATRIC:  Negative for anxiety, depression, and sleep disturbances.          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      Positive for Type 2 Diabetes ( mother ).          Social History:     Occupation: a factory     Marital Status:      Children: 3 children     ALONSO denies any current form of exercise.          Tobacco/Alcohol/Supplements:     Last Reviewed on 6/18/2020 04:26 PM by Spurling, Sarah C    Tobacco: He has never smoked.  Non-drinker     Caffeine:  He admits to consuming caffeine via coffee ( 3 servings per day ) and tea ( 1 serving per day ).          Substance Abuse History:     Last Reviewed on 1/24/2019 09:57 AM by Cristina Perez    NEGATIVE         Mental Health History:     Last Reviewed on 1/24/2019 09:57 AM by Cristina Perez        Communicable Diseases (eg STDs):     Last Reviewed on 1/24/2019 09:57 AM by Cristina Perez        Current Problems:     Last Reviewed on 6/18/2020 04:26 PM by Spurling, Sarah C    Other specified nontoxic goiter    Gastro-esophageal reflux disease without esophagitis    Personal history of pulmonary embolism    Other fatigue    Mixed hyperlipidemia    Vomiting, unspecified    Encounter for screening for malignant neoplasm  of prostate        Immunizations:     Influenza Virus Vaccine, unspecified formulation 2/14/2017    Tdap (Tetanus, reduced diph, acellular pertussis) 9/24/2018        Allergies:     Last Reviewed on 6/18/2020 04:26 PM by Spurling, Sarah C    Sulfonamides:      Phenobarbital:      Neosporin:      hydrogen peroxide:          Current Medications:     Last Reviewed on 6/18/2020 04:31 PM by Spurling, Sarah C    omeprazole 40 mg oral capsule,delayed release (enteric coated) [TAKE ONE CAPSULE BY MOUTH DAILY]    Allegra Allergy 180 mg oral tablet [Take 1 tablet(s) by mouth daily]    atorvastatin 10 mg oral tablet [TAKE ONE TABLET BY MOUTH DAILY]    aspirin 81 mg oral tablet, delayed release (enteric coated) [1 tab daily]    COQ10 Q DAY     VITAMIN B COMPLEX     VITAMIN D     Sudafed PRN         Objective:        Vitals:         Historical:     11/26/2019  BP:   134/76 mm Hg ( (left arm, , sitting, );) 5/14/2019  BP:   133/67 mm Hg ( (left arm, , sitting, );) 1/24/2019  BP:   154/69 mm Hg ( (left arm, , sitting, );) 11/26/2019  Wt:   271.2lbs    Current: 6/18/2020 4:33:36 PM    Ht:  5 ft, 10.75 in;  Wt: 275.8 lbs;  BMI: 38.7T: 98 F (oral);  BP: 132/66 mm Hg (right arm, sitting);  P: 90 bpm (right arm (BP Cuff), sitting);  sCr: 0.74 mg/dL;  GFR: 118.29        Exams:     PHYSICAL EXAM:     GENERAL: no apparent distress;     RESPIRATORY: normal respiratory rate and pattern with no distress; normal breath sounds with no rales, rhonchi, wheezes or rubs;     CARDIOVASCULAR: normal rate; rhythm is regular;     Peripheral Pulses: declined to remove boots; 1+ edema bilateral lower legs and hyperpigmentation edema;     MUSCULOSKELETAL: gait: uses a cane;  normal range of motion of all major muscle groups; no limb or joint pain with range of motion; muscle strength: 5/5 in all major muscle groups;  normal overall tone     NEUROLOGIC: mental status: alert and oriented x 3; GROSSLY INTACT     PSYCHIATRIC:  appropriate affect and  demeanor; normal speech pattern; grossly normal memory;         Assessment:         K21.9   Gastro-esophageal reflux disease without esophagitis       E78.2   Mixed hyperlipidemia       D72.829   Elevated white blood cell count, unspecified       R60.9   Edema, unspecified           ORDERS:         Meds Prescribed:       [Refilled] omeprazole 40 mg oral capsule,delayed release (enteric coated) [TAKE ONE CAPSULE BY MOUTH DAILY], #90 (ninety) capsules, Refills: 1 (one)       [Refilled] atorvastatin 10 mg oral tablet [TAKE ONE TABLET BY MOUTH DAILY], #90 (ninety) tablets, Refills: 1 (one)         Lab Orders:       [id number]  [facility] Lipid Panel  (Send-Out)            [id number]  [facility] CBC with 3 part diff  (Send-Out)              Procedures Ordered:       REFER  Referral to Specialist or Other Facility  (Send-Out)                      Plan:         Gastro-esophageal reflux disease without esophagitis        RECOMMENDATIONS given include: discontinuation of NSAIDs and aspirin, avoidance of spicy foods, weight loss, and avoid eating 3-4 hours before bed.            Prescriptions:       [Refilled] omeprazole 40 mg oral capsule,delayed release (enteric coated) [TAKE ONE CAPSULE BY MOUTH DAILY], #90 (ninety) capsules, Refills: 1 (one)         Mixed hyperlipidemia    LABORATORY:  Labs ordered to be performed today include lipid panel.            Prescriptions:       [Refilled] atorvastatin 10 mg oral tablet [TAKE ONE TABLET BY MOUTH DAILY], #90 (ninety) tablets, Refills: 1 (one)           Orders:       [id number]  [facility] Lipid Panel  (Send-Out)              Elevated white blood cell count, unspecified    LABORATORY:  Labs ordered to be performed today include CBC.            Orders:       [id number]  [facility] with 3 part diff  (Send-Out)              Edema, unspecified        REFERRALS:  Referral initiated to vascular specialist.      RECOMMENDATIONS given include: limitation of excessive use of the swollen extremity,  elevation of the legs as much as possible, restriction of sodium intake, and use of support hose.            Orders:       REFER  Referral to Specialist or Other Facility  (Send-Out)                  Patient Recommendations:        For  Gastro-esophageal reflux disease without esophagitis:    Don't take aspirin or anti-inflammatory medications such as ibuprofen and naproxen, as these will further irritate your stomach.  (Tylenol is okay.) Avoid spicy foods if they tend to make your symptoms worse. Lose weight. Avoid eating 3-4 hours before bedtime.          For  Edema, unspecified:    I also recommend vascular specialist.  Limit excessive use of the swollen extremity. Elevate the swollen extremity as much as possible to reduce swelling. Restrict the use of salt in your diet. Begin wearing support hose/stockings to improve the circulation in your legs and decrease swelling.              Charge Capture:         Primary Diagnosis:     K21.9  Gastro-esophageal reflux disease without esophagitis           Orders:      [id number]  Office/outpatient visit; established patient, level 4  (In-House)              E78.2  Mixed hyperlipidemia     D72.829  Elevated white blood cell count, unspecified     R60.9  Edema, unspecified

## 2021-05-18 NOTE — PROGRESS NOTES
Yemi Pabon  1955     Office/Outpatient Visit    Visit Date: Thu, Jan 14, 2021 01:40 pm    Provider: Jyoti Mcduffie N.P. (Assistant: Hemalatha Perez,  )    Location: [facility]        Electronically signed by Jyoti Mcduffie N.P. on  01/14/2021 07:41:23 PM                             Subjective:        CC: Fabian is a 65 year old White male.  med f/u phone visit only [phone]        HPI: telephone visit          Patient to be evaluated for gastro-esophageal reflux disease without esophagitis.  stable on omeprazole.  denies side effects.  requests refills.  EGD feb 2018- erosive gastritis.  feels that removing stomach polyp improved symptoms.            Dx with mixed hyperlipidemia; current treatment includes Lipitor and a low cholesterol/low fat diet.  Compliance with treatment has been good; he takes his medication as directed.  He denies experiencing any hypercholesterolemia related symptoms.            Additionally, he presents with history of dizziness and giddiness.  this first began approximately 1 month ago.  He describes the sensation as lightheaded or room spinning.  This is fairly mild in severity.  The frequency of the episodes is once every few days.  The average duration of each episode is a few seconds.  He states that it is worsened with sudden position changes.  The dizziness improves with lying still.  Associated symptoms include allergy symptoms.  He denies blurred vision, chest pain or pressure, nausea or vomiting.            Concerning edema, unspecified, stable.  does not wear compresssion stocking regularly but does not help much when worn.      ROS:     CONSTITUTIONAL:  Negative for chills, fatigue, fever, and weight change.      E/N/T:  Positive for nasal congestion and frequent rhinorrhea ( clear, improved on coricidin hbp ).   Negative for sinus pressure or sore throat.      CARDIOVASCULAR:  Positive for pedal edema ( stable ).   Negative for chest pain or  palpitations.      RESPIRATORY:  Negative for cough, dyspnea, and hemoptysis.      NEUROLOGICAL:  Positive for dizziness and vertigo (brief).   Negative for ataxia, fainting, headaches, memory loss, paresthesias, tremor or weakness.      ALLERGIC/IMMUNOLOGIC:  Positive for seasonal allergies.      PSYCHIATRIC:  Negative for anxiety, depression, and sleep disturbances.          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      Positive for Type 2 Diabetes ( mother ).          Social History:     Occupation: a factory     Marital Status:      Children: 3 children     ALONSO denies any current form of exercise.          Tobacco/Alcohol/Supplements:     Last Reviewed on 6/18/2020 04:26 PM by Spurling, Sarah C    Tobacco: He has never smoked.  Non-drinker     Caffeine:  He admits to consuming caffeine via coffee ( 3 servings per day ) and tea ( 1 serving per day ).          Substance Abuse History:     Last Reviewed on 1/24/2019 09:57 AM by Cristina Perez    NEGATIVE         Mental Health History:     Last Reviewed on 1/24/2019 09:57 AM by Cristina Perez        Communicable Diseases (eg STDs):     Last Reviewed on 1/24/2019 09:57 AM by Cristina Perez        Current Problems:     Last Reviewed on 1/14/2021  07:35 PM by Jyoti Mcduffie    Other specified nontoxic goiter    Gastro-esophageal reflux disease without esophagitis    Personal history of pulmonary embolism    Mixed hyperlipidemia    Edema, unspecified    Dizziness and giddiness        Immunizations:     Influenza Virus Vaccine, unspecified formulation 2/14/2017    Tdap (Tetanus, reduced diph, acellular pertussis) 9/24/2018        Allergies:     Last Reviewed on 6/18/2020 04:26 PM by Spurling, Sarah C    Sulfonamides:      Phenobarbital:      Neosporin:      hydrogen peroxide:          Current Medications:     Last Reviewed on 6/18/2020 04:31 PM by Spurling, Sarah C    omeprazole 40 mg oral capsule,delayed release (enteric coated) [TAKE ONE CAPSULE BY MOUTH DAILY]    Allegra Allergy 180 mg oral tablet [TAKE ONE TABLET BY MOUTH DAILY]    atorvastatin 10 mg oral tablet [TAKE ONE TABLET BY MOUTH DAILY]    aspirin 81 mg oral tablet, delayed release (enteric coated) [1 tab daily]    COQ10 Q DAY     VITAMIN B COMPLEX     VITAMIN D     Sudafed PRN         Assessment:         K21.9   Gastro-esophageal reflux disease without esophagitis       E78.2   Mixed hyperlipidemia       R42   Dizziness and giddiness       R60.9   Edema, unspecified           ORDERS:         Meds Prescribed:       [Refilled] atorvastatin 10 mg oral tablet [TAKE ONE TABLET BY MOUTH DAILY], #90 (ninety) tablets, Refills: 1 (one)       [Refilled] omeprazole 40 mg oral capsule,delayed release (enteric coated) [TAKE ONE CAPSULE BY MOUTH DAILY], #90 (ninety) capsules, Refills: 1 (one)         Radiology/Test Orders:       3017F  Colorectal CA screen results documented and reviewed (PV)  (In-House)              Lab Orders:       [id number]  [facility] CBC w/o diff  (Send-Out)            71137  COMP - [facility] Comp. Metabolic Panel  (Send-Out)            44006  DP - [facility] Lipid Panel  (Send-Out)              Procedures Ordered:       REFER  Referral to Specialist or Other Facility  (Send-Out)              Other  Orders:       1101F  Pt screen for fall risk; document no falls in past year or only 1 fall w/o injury in past year ([facility])  (In-House)                      Plan:         Gastro-esophageal reflux disease without esophagitis    MIPS Has had no falls or only one fall without injury in the past year Vaccines Flu and Pneumonia updated in Shot record Colorectal Cancer Screening is up to date and the results are in the chart Telehealth: Verbal consent obtained for visit to occur via phone call; Total time spent was 12 minutes; 63029--Eugyfrhho E/M 11-20 minutes           Prescriptions:       [Refilled] omeprazole 40 mg oral capsule,delayed release (enteric coated) [TAKE ONE CAPSULE BY MOUTH DAILY], #90 (ninety) capsules, Refills: 1 (one)           Orders:       1101F  Pt screen for fall risk; document no falls in past year or only 1 fall w/o injury in past year ([facility])  (In-House)            3017F  Colorectal CA screen results documented and reviewed (PV)  (In-House)              Mixed hyperlipidemia    LABORATORY:  Labs ordered to be performed today include lipid panel.            Prescriptions:       [Refilled] atorvastatin 10 mg oral tablet [TAKE ONE TABLET BY MOUTH DAILY], #90 (ninety) tablets, Refills: 1 (one)           Orders:       [id number]  [facility] - [facility] Lipid Panel  (Send-Out)              Dizziness and giddiness    LABORATORY:  Labs ordered to be performed today include CBC W/O DIFF and Comprehensive metabolic panel.      RECOMMENDATIONS given include: use caution when driving and pt feels as if due to inner ear issue which is possible.   continue allegra and add flonase nose spray daily x 30 days.   needs to go to ER if chest pain, headache, shortness of breath or any other signs of worsening.  other causes could be neurolgical or cardiac.  pt verbalizes understanding..            Orders:       [id number]  [facility] - [facility] CBC w/o diff  (Send-Out)            53346  COMP - [facility] Comp. Metabolic Panel  (Send-Out)              Edema,  unspecified        REFERRALS:  Referral initiated to.  vascular          Orders:       REFER  Referral to Specialist or Other Facility  (Send-Out)                  Patient Recommendations:        For  Dizziness and giddiness:    Use caution when driving.              Charge Capture:         Primary Diagnosis:     K21.9  Gastro-esophageal reflux disease without esophagitis           Orders:      [id number]  Phys/QHP telephone evaluation 11-20 minutes  (In-House)            1101F  Pt screen for fall risk; document no falls in past year or only 1 fall w/o injury in past year ([facility])  (In-House)            3017F  Colorectal CA screen results documented and reviewed (PV)  (In-House)              E78.2  Mixed hyperlipidemia     R42  Dizziness and giddiness     R60.9  Edema, unspecified

## 2021-05-18 NOTE — PROGRESS NOTES
Yemi Pabon 1955     Office/Outpatient Visit    Visit Date: Thu, Jan 24, 2019 09:53 am    Provider: Jay Velazco MD (Assistant: Cristina Perez MA)    Location: [facility]        Electronically signed by Jay Velazco MD on  01/24/2019 06:42:52 PM                             SUBJECTIVE:        CC:     Fabian is a 63 year old White male.  Patient complains of sinus congestion and pressure.          HPI:     Sinus congestion and nasal congestion off and on for about a week. Definitely for the last 3 days. Has been off work for the last 3 days. Unsure if he has a fever but the top of head feels warm. Sore throat on left side, feels like a swollen tonsil. He has fatigue. He reports a sinus cough and sneeze. He is on allegra and a sinus decongestant like sudafed.     ROS:     CONSTITUTIONAL:  Negative for fatigue and fever.      EYES:  Negative for blurred vision.      E/N/T:  Positive for nasal congestion, sinus pressure and sore throat.   Negative for diminished hearing.      CARDIOVASCULAR:  Negative for chest pain and palpitations.      RESPIRATORY:  Positive for recent cough ( typically dry ).   Negative for dyspnea.      GASTROINTESTINAL:  Negative for abdominal pain, constipation, diarrhea, nausea and vomiting.      MUSCULOSKELETAL:  Negative for arthralgias and myalgias.      INTEGUMENTARY:  Negative for atypical mole(s) and rash.      NEUROLOGICAL:  Positive for headaches.   Negative for paresthesias or weakness.      PSYCHIATRIC:  Negative for anxiety, depression and sleep disturbance.          PMH/FMH/SH:     Last Reviewed on 1/24/2019 09:57 AM by Cristina Perez    Past Medical History:             PAST MEDICAL HISTORY     normal heart echo 2017     Fracture(s): left foot fx;     Hospitalizations: bilateral PE, 5-31-16 [facility]TE[facility]         EGD- feb 2018- stomach polyps , erosive gastritis     COLORECTAL CANCER SCREENING: Up to date (colonoscopy q10y;  sigmoidoscopy q5y; Cologuard q3y) was last done 03/2018, Results are in chart; colonoscopy with normal results         Surgical History:     NONE         Family History:         Positive for Coronary Artery Disease ( father ).      Positive for Type 2 Diabetes ( mother ).          Social History:     Occupation: a factory     Marital Status:      Children: 3 children     ALONSO denies any current form of exercise.          Tobacco/Alcohol/Supplements:     Last Reviewed on 1/24/2019 09:57 AM by Cristina Perez    Tobacco: He has never smoked.  Non-drinker     Caffeine:  He admits to consuming caffeine via coffee ( 3 servings per day ) and tea ( 1 serving per day ).          Substance Abuse History:     Last Reviewed on 1/24/2019 09:57 AM by Cristina Perez    NEGATIVE         Mental Health History:     Last Reviewed on 1/24/2019 09:57 AM by Cristina Perez        Communicable Diseases (eg STDs):     Last Reviewed on 1/24/2019 09:57 AM by Cristina Perez            Current Problems:     Last Reviewed on 1/24/2019 09:57 AM by Cristina Perez    Vomiting     GERD     Hypercholesterolemia     Fatigue     History of pulmonary embolism     Thyromegaly     Acute frontal sinusitis         Immunizations:     Influenza Virus Vaccine, unspecified formulation 2/14/2017     Tdap (Tetanus, reduced diph, acellular pertussis) 9/24/2018         Allergies:     Last Reviewed on 1/24/2019 09:57 AM by Cristina Perez    Sulfonamides:    Phenobarbital:    Neosporin:    hydrogen peroxide:        Current Medications:     Last Reviewed on 1/24/2019 09:57 AM by Cristina Perez    Allegra Allergy 24 Hour 180mg Tablet Take 1 tablet(s) by mouth daily     Lipitor 10mg Tablet Take 1 tablet(s) by mouth daily     Omeprazole 40mg Capsules, Extended Release Take 1 capsule(s) by mouth daily     Aspirin (ASA) 81mg Tablets, Enteric Coated 1 tab daily     Augmentin 875mg/125mg Tablet 1 tab bid X 10 days         OBJECTIVE:        Vitals:          Current: 1/24/2019 9:58:31 AM    Ht:  5 ft, 10.75 in;  Wt: 265 lbs;  BMI: 37.2    T: 98.2 F (oral);  BP: 154/69 mm Hg (left arm, sitting);  P: 83 bpm (right arm (BP Cuff), sitting);  sCr: 0.75 mg/dL;  GFR: 116.21    O2 Sat: 91 % (room air)        Exams:     PHYSICAL EXAM:     GENERAL: Vitals recorded well developed, well nourished;  well groomed;  no apparent distress;     EYES: extraocular movements intact; conjunctiva and cornea are normal; PERRLA;     E/N/T: OROPHARYNX:  normal mucosa, dentition, gingiva, and posterior pharynx;     NECK: range of motion is normal; thyroid exam reveals not enlarged;     RESPIRATORY: normal respiratory rate and pattern with no distress; normal breath sounds with no rales, rhonchi, wheezes or rubs;     CARDIOVASCULAR: normal rate; rhythm is regular;  no systolic murmur; no edema;     GASTROINTESTINAL: nontender; normal bowel sounds;     LYMPHATIC: left submandibular node;     MUSCULOSKELETAL: normal gait; normal overall tone     NEUROLOGIC: mental status: alert and oriented x 3;     PSYCHIATRIC:  appropriate affect and demeanor; normal speech pattern; grossly normal memory;         Lab/Test Results:             Rapid Strep Screen:  Negative (01/24/2019),     Performed by::  and (01/24/2019),     Influenza A and B:  Negative (01/24/2019),             ASSESSMENT           461.8   J01.90  Acute sinusitis, other              DDx:         ORDERS:         Meds Prescribed:       Flonase Allergy Relief (Fluticasone Propionate) 50mcg/1actuation Nasal Spray 1 spray eac nostril once daily x 2 weeks then once daily prn allergy symptoms  #16 (Sixteen) ml Refills: 0       Mucinex (Guaifenesin) 600mg Tablets, Extended Release 1 bid prn  #60 (Sixty) tablet(s) Refills: 0         Lab Orders:       [id number]  [facility] Rapid strep A  (In-House)         52149  Infectious agent antigen detection by immunoassay; Influenza  (In-House)         85098  Infectious agent antigen detection by immunoassay;  Influenza  (In-House)                   PLAN:          Acute sinusitis, other Viral URI, should resolve with time and rest. strep and flu negative in clinic.     LABORATORY:  Labs ordered to be performed today include rapid strep test.      TESTS/PROCEDURES:  Will proceed with Flu A&B Flu A Flu B to be performed/scheduled now.  School/Work Excuse for Yesterday Today 1/21 - 1/25           Prescriptions:       Flonase Allergy Relief (Fluticasone Propionate) 50mcg/1actuation Nasal Spray 1 spray eac nostril once daily x 2 weeks then once daily prn allergy symptoms  #16 (Sixteen) ml Refills: 0       Mucinex (Guaifenesin) 600mg Tablets, Extended Release 1 bid prn  #60 (Sixty) tablet(s) Refills: 0           Orders:       [id number]  [facility]STR - [facility] Rapid strep A  (In-House)         98251  Infectious agent antigen detection by immunoassay; Influenza  (In-House)         15928  Infectious agent antigen detection by immunoassay; Influenza  (In-House)               CHARGE CAPTURE           **Please note: ICD descriptions below are intended for billing purposes only and may not represent clinical diagnoses**        Primary Diagnosis:         461.8 Acute sinusitis, other            J01.90    Acute sinusitis, unspecified              Orders:          [id number]   Office/outpatient visit; established patient, level 4  (In-House)             44028   BDSTR - [facility] Rapid strep A  (In-House)             05146   Infectious agent antigen detection by immunoassay; Influenza  (In-House)             06312   Infectious agent antigen detection by immunoassay; Influenza  (In-House)

## 2021-05-18 NOTE — PROGRESS NOTES
Yemi Pabon 1955     Office/Outpatient Visit    Visit Date: Tue, May 14, 2019 06:04 pm    Provider: Jyoti Mcduffie N.P. (Assistant: Pao Ponce MA)    Location: [facility]        Electronically signed by Jyoti Mcduffie N.P. on  05/31/2019 10:25:15 PM                             SUBJECTIVE:        CC:     Fabian is a 63 year old White male.  medication refills         HPI:         With regard to the hypercholesterolemia, current treatment includes Lipitor and a low cholesterol/low fat diet.  Compliance with treatment has been good; he takes his medication as directed.  He denies experiencing any hypercholesterolemia related symptoms.          In regard to the gERD, gERD details; stable on omeprazole.  denies side effects.  requests refills.  EGD feb 2018- erosive gastritis.  feels that removing stomach polyp improved symptoms.      US and ENT 2007 .  declines repeat thyroid US.     ROS:     CONSTITUTIONAL:  Negative for chills, fatigue, fever, and weight change.      CARDIOVASCULAR:  Negative for chest pain, palpitations, tachycardia, orthopnea, and edema.      RESPIRATORY:  Negative for cough, dyspnea, and hemoptysis.      GASTROINTESTINAL:  Positive for acid reflux symptoms ( stable ).   Negative for constipation, diarrhea, nausea or vomiting.      GENITOURINARY:  Negative for dysuria, genital lesions, hematuria, impotence, polyuria, and changes in urine stream.      MUSCULOSKELETAL:  Negative for arthralgias, back pain, and myalgias.      NEUROLOGICAL:  Negative for dizziness, headaches, paresthesias, and weakness.      PSYCHIATRIC:  Negative for anxiety, depression, and sleep disturbances.          PMH/FMH/SH:     Last Reviewed on 1/24/2019 09:57 AM by Cristina Perez    Past Medical History:             PAST MEDICAL HISTORY     normal heart echo 2017     Fracture(s): left foot fx;     Hospitalizations: bilateral PE, 5-31-16 [facility]         EGD-  feb 2018- stomach polyps , erosive gastritis     COLORECTAL CANCER SCREENING: Up to date (colonoscopy q10y; sigmoidoscopy q5y; Cologuard q3y) was last done 03/2018, Results are in chart; colonoscopy with normal results         Surgical History:     NONE         Family History:         Positive for Coronary Artery Disease ( father ).      Positive for Type 2 Diabetes ( mother ).          Social History:     Occupation: a factory     Marital Status:      Children: 3 children     ALONSO denies any current form of exercise.          Tobacco/Alcohol/Supplements:     Last Reviewed on 1/24/2019 09:59 AM by Cristina Perez    Tobacco: He has never smoked.  Non-drinker     Caffeine:  He admits to consuming caffeine via coffee ( 3 servings per day ) and tea ( 1 serving per day ).          Substance Abuse History:     Last Reviewed on 1/24/2019 09:57 AM by Cristina Perez    NEGATIVE         Mental Health History:     Last Reviewed on 1/24/2019 09:57 AM by Cristina Perez        Communicable Diseases (eg STDs):     Last Reviewed on 1/24/2019 09:57 AM by Cristina Perez            Current Problems:     Last Reviewed on 1/24/2019 09:57 AM by Cristina Perez    Vomiting     GERD     Hypercholesterolemia     Fatigue     History of pulmonary embolism     Thyromegaly         Immunizations:     Influenza Virus Vaccine, unspecified formulation 2/14/2017     Tdap (Tetanus, reduced diph, acellular pertussis) 9/24/2018         Allergies:     Last Reviewed on 1/24/2019 09:59 AM by Crisitna Perez    Sulfonamides:    Phenobarbital:    Neosporin:    hydrogen peroxide:        Current Medications:     Last Reviewed on 5/14/2019 06:07 PM by Pao Ponce    Lipitor 10mg Tablet Take 1 tablet(s) by mouth daily     Omeprazole 40mg Capsules, Extended Release Take 1 capsule(s) by mouth daily     Allegra Allergy 24 Hour 180mg Tablet Take 1 tablet(s) by mouth daily     Aspirin (ASA) 81mg Tablets, Enteric Coated 1 tab daily     COQ10 Q  DAY     Sudafed PRN     VITAMIN B COMPLEX     VITAMIN D         OBJECTIVE:        Vitals:         Historical:     01/24/2019  BP:   154/69 mm Hg ( (left arm, , sitting, );)     01/24/2019  Wt:   265lbs        Current: 5/14/2019 6:10:36 PM    Ht:  5 ft, 10.75 in;  Wt: 277.8 lbs;  BMI: 39.0    T: 98.4 F (oral);  BP: 133/67 mm Hg (left arm, sitting);  P: 90 bpm (left arm (BP Cuff), sitting);  sCr: 0.75 mg/dL;  GFR: 118.56        Exams:     PHYSICAL EXAM:     GENERAL:  well developed and nourished; appropriately groomed; in no apparent distress;     NECK: thyroid is non-palpable;     RESPIRATORY: normal respiratory rate and pattern with no distress; normal breath sounds with no rales, rhonchi, wheezes or rubs;     CARDIOVASCULAR: normal rate; rhythm is regular;  no edema;     LYMPHATICS:  no adenopathy in cervical, supraclavicular, axillary, or inguinal regions;     MUSCULOSKELETAL:  Normal range of motion, strength and tone;     NEUROLOGIC: mental status: alert and oriented x 3; GROSSLY INTACT     PSYCHIATRIC:  appropriate affect and demeanor; normal speech pattern; grossly normal memory;         ASSESSMENT           272.0   E78.2  Hypercholesterolemia              DDx:     530.81   K21.9  GERD              DDx:     241.0   E04.8  Thyroid nodule              DDx:         ORDERS:         Meds Prescribed:       Refill of: Omeprazole 40mg Capsules, Extended Release Take 1 capsule(s) by mouth daily  #90 (Ninety) capsule(s) Refills: 1         Lab Orders:       [id number]  [facility] - [facility] CMP AND LIPID: [id number], [id number]  (Send-Out)                   PLAN:          Hypercholesterolemia     LABORATORY:  Labs ordered to be performed today include HTN/Lipid Panel: CMP, Lipid.      RECOMMENDATIONS given include: exercise and low cholesterol/low fat diet.            Orders:       [id number]  [facility] - [facility] CMP AND LIPID: [id number], [id number]  (Send-Out)            GERD         RECOMMENDATIONS given include: avoidance of spicy foods and avoid eating 3-4 hours  before bed.            Prescriptions:       Refill of: Omeprazole 40mg Capsules, Extended Release Take 1 capsule(s) by mouth daily  #90 (Ninety) capsule(s) Refills: 1          Thyroid noduledeclines further work up at this time. not impressive on exam.             Patient Recommendations:        For  Hypercholesterolemia:     Maintain a regular exercise program. Reduce the amount of cholesterol and saturated fat in your diet.          For  GERD:     Avoid spicy foods if they tend to make your symptoms worse. Avoid eating 3-4 hours before bedtime.              CHARGE CAPTURE           **Please note: ICD descriptions below are intended for billing purposes only and may not represent clinical diagnoses**        Primary Diagnosis:         272.0 Hypercholesterolemia            E78.2    Mixed hyperlipidemia              Orders:          79269   Office/outpatient visit; established patient, level 4  (In-House)           530.81 GERD            K21.9    Gastro-esophageal reflux disease without esophagitis    241.0 Thyroid nodule            E04.8    Other specified nontoxic goiter

## 2021-05-18 NOTE — PROGRESS NOTES
Yemi PabonNavneet 1955     Office/Outpatient Visit    Visit Date: Wed, Jan 31, 2018 01:02 pm    Provider: Jyoti Mcduffie N.P. (Assistant: Belinda Mcmillan MA)    Location: [facility]        Electronically signed by Jyoti Mcduffie N.P. on  02/03/2018 09:42:30 AM                             SUBJECTIVE:        CC:     Fabian is a 62 year old White male.  Headache         HPI:         Fabian presents with headache.  Onset was yesterday.  The location is primarily left occipital.  The pain radiates to the neck.  He has had prior headaches similar to this one. Typical headache frequency is 2-3 times per year.  He characterizes it as throbbing.  Associated symptoms include nausea.  He denies altered consciousness, fever, photophobia, sinus congestion, stiff neck or vision disturbance.  no interventions have been attempted due to over the counter has not helped previously.  rec'd injection here last year that helped with headache of same character.  requests injection.   head ct 2016 negative.      ROS:     CONSTITUTIONAL:  Positive for fatigue.   Negative for chills or fever.      EYES:  Negative for blurred vision, eye pain, and photophobia.      E/N/T:  Negative for hearing problems, E/N/T pain, congestion, rhinorrhea, epistaxis, hoarseness, and dental problems.      CARDIOVASCULAR:  Negative for chest pain, palpitations, tachycardia, orthopnea, and edema.      RESPIRATORY:  Negative for cough, dyspnea, and hemoptysis.      GASTROINTESTINAL:  Positive for nausea.   Negative for abdominal pain, constipation, diarrhea or vomiting.      MUSCULOSKELETAL:  Negative for arthralgias, back pain, and myalgias.      NEUROLOGICAL:  Positive for headaches.   Negative for dizziness, paresthesias, vertigo or weakness.      ENDOCRINE:  Negative for hair loss, heat/cold intolerance, polydipsia, and polyphagia.      PSYCHIATRIC:  Negative for anxiety, depression, and sleep disturbances.          PMH/FMH/SH:     Last  Reviewed on 1/23/2018 10:41 AM by Vahe Jones    Past Medical History:         Fracture(s): left foot fx;         PREVENTIVE HEALTH MAINTENANCE             COLONOSCOPY: Done within the last 10 years was last done 1/2008 with normal results The next one is due  at age 65     INFLUENZA VACCINE: declines, understands reason for immunization         Surgical History:     NONE         Family History:         Positive for Coronary Artery Disease ( father ).      Positive for Type 2 Diabetes ( mother ).          Social History:     Occupation: a factory     Marital Status:      Children: 3 children     ALONSO denies any current form of exercise.          Tobacco/Alcohol/Supplements:     Last Reviewed on 1/23/2018 10:41 AM by Vahe Jones    Tobacco: He has never smoked.  Non-drinker     Caffeine:  He admits to consuming caffeine via coffee ( 3 servings per day ) and tea ( 1 serving per day ).          Substance Abuse History:     Last Reviewed on 1/23/2018 10:41 AM by Vahe Jones    NEGATIVE         Mental Health History:     Last Reviewed on 1/23/2018 10:41 AM by Vahe Jones        Communicable Diseases (eg STDs):     Last Reviewed on 1/23/2018 10:41 AM by Vahe Jones            Current Problems:     Last Reviewed on 1/23/2018 10:41 AM by Vahe Jones    GERD     Hypercholesterolemia     Fatigue     History of pulmonary embolism     Thyromegaly     Acute sinusitis, other     Upper respiratory infection         Immunizations:     Influenza Virus Vaccine, unspecified formulation 2/14/2017         Allergies:     Last Reviewed on 1/23/2018 10:41 AM by Vahe Jones    Sulfonamides:    Phenobarbital:    Neosporin:    hydrogen peroxide:        Current Medications:     Last Reviewed on 1/31/2018 01:08 PM by Belinda Mcmillan Allergy 24 Hour 180mg Tablet Take 1 tablet(s) by mouth daily     Lipitor 10mg Tablet Take 1 tablet(s) by mouth daily     Omeprazole 40mg Capsules,  Extended Release Take 1 capsule(s) by mouth daily     Aspirin (ASA) 81mg Tablets, Enteric Coated 1 tab daily     Erythromycin Base 333mg Tablets, Enteric Coated Take 1 tablet(s) by mouth q8h     Fluticasone Propionate 50mcg/1actuation Nasal Spray 1 spray each nostil daily         OBJECTIVE:        Vitals:         Historical:     01/23/2018  BP:   135/85 mm Hg ( (left arm, , sitting, );)     01/23/2018  Wt:   256lbs        Current: 1/31/2018 1:06:32 PM    Ht:  5 ft, 10.75 in;  Wt: 255 lbs;  BMI: 35.8    T: 98.9 F (oral);  BP: 142/88 mm Hg (left arm, sitting);  P: 100 bpm (left arm (BP Cuff), sitting);  sCr: 0.89 mg/dL;  GFR: 97.55        Exams:     PHYSICAL EXAM:     GENERAL:  well developed and nourished; appropriately groomed; in no apparent distress;     EYES: PERRL, EOMI     E/N/T: EARS: bilateral TMs are normal;  NOSE: normal nasal mucosa; OROPHARYNX: posterior pharynx, including tonsils, tongue, and uvula are normal;     RESPIRATORY: normal respiratory rate and pattern with no distress; normal breath sounds with no rales, rhonchi, wheezes or rubs;     CARDIOVASCULAR: normal rate; rhythm is regular;     LYMPHATIC: no enlargement of cervical or facial nodes;     MUSCULOSKELETAL:  Normal range of motion, strength and tone;     NEUROLOGIC: mental status: alert and oriented x 3; cranial nerves: III intact; IV intact; V intact; VI intact; VII intact; XI intact;  GROSSLY INTACT     PSYCHIATRIC:  appropriate affect and demeanor; normal speech pattern; grossly normal memory;         Procedures:     Headache     1. Reglan 10 mg/ 2ml given IM in the right hip; [facility]  [facility] number [id number]; expires 08/20     2. Toradol: 60 mg given IM, in the left hip; [facility]; ( lot #[id number]; exp. 10/19 )             ASSESSMENT           784.0   R51  Headache              DDx:         ORDERS:         Other Orders:       [id number]  Therapeutic injection  (In-House)         93552  Therapeutic injection  (In-House)            Toradol, per 15 mg (x4)         Injection, metoclopramide HCl, up to 10 mg (x1)                 PLAN:          Headache         RECOMMENDATIONS given include: increase oral fluid intake, maintain normal sleep patterns, and to ER if not improving or signs of worsening-  numbness, tingling, unilateral weakness or numbness, slurred speech or other signs of worsening..            Orders:       [id number]  Therapeutic injection  (In-House)                     Toradol, per 15 mg (x4)       84925  Therapeutic injection  (In-House)                     Injection, metoclopramide HCl, up to 10 mg (x1)           Patient Education Handouts:       Cluster Headache        Migraine Headache        Tension Headache              Patient Recommendations:        For  Headache:     Drink more liquids. Maintain normal sleep patterns, avoid lack of sleep, fatigue or oversleep.              CHARGE CAPTURE           **Please note: ICD descriptions below are intended for billing purposes only and may not represent clinical diagnoses**        Primary Diagnosis:         784.0 Headache            R51    Headache              Orders:          [id number]   Office/outpatient visit; established patient, level 3  (In-House)             92949   Therapeutic injection  (In-House)                                           Toradol, per 15 mg (x4)           86648   Therapeutic injection  (In-House)                                           Injection, metoclopramide HCl, up to 10 mg (x1)

## 2021-05-18 NOTE — PROGRESS NOTES
PabonYemi CLINTON  1955     Office/Outpatient Visit    Visit Date: Tue, Nov 26, 2019 05:25 pm    Provider: Jyoti Mcduffie N.P. (Assistant: Spurling, Sarah C, MA)    Location: [facility]        Electronically signed by Jyoti Mcduffie N.P. on  11/29/2019 07:34:13 PM                             Subjective:        CC: Fabian is a 64 year old White male.  This is a follow-up visit.  med refills.          HPI:           In regard to the gERD, gERD details; stable on omeprazole.  denies side effects.  requests refills.  EGD feb 2018- erosive gastritis.  feels that removing stomach polyp improved symptoms.            With regard to the hypercholesterolemia, current treatment includes Lipitor and a low cholesterol/low fat diet.  Compliance with treatment has been good; he takes his medication as directed.  He denies experiencing any hypercholesterolemia related symptoms.      ROS:     CONSTITUTIONAL:  Negative for chills, fatigue, fever, and weight change.      CARDIOVASCULAR:  Negative for chest pain, palpitations, tachycardia, orthopnea, and edema.      RESPIRATORY:  Negative for cough, dyspnea, and hemoptysis.      GASTROINTESTINAL:  Negative for abdominal pain, heartburn, constipation, diarrhea, and stool changes.      GENITOURINARY:  Negative for dysuria, genital lesions, hematuria, impotence, polyuria, and changes in urine stream.      MUSCULOSKELETAL:  Negative for arthralgias, back pain, and myalgias.      NEUROLOGICAL:  Negative for dizziness, headaches, paresthesias, and weakness.      ENDOCRINE:  Negative for hair loss, heat/cold intolerance, polydipsia, and polyphagia.      PSYCHIATRIC:  Negative for anxiety, depression, and sleep disturbances.          Past Medical History / Family History / Social History:         Last Reviewed on 11/26/2019 05:53 PM by Jyoti Mcduffie    Past Medical History:             PAST MEDICAL HISTORY     normal heart echo 2017     Fracture(s): left foot fx;      Hospitalizations: bilateral PE, 5-31-16 [facility] MAINTENANCE         EGD- feb 2018- stomach polyps , erosive gastritis     COLORECTAL CANCER SCREENING: Up to date (colonoscopy q10y; sigmoidoscopy q5y; Cologuard q3y) was last done 03/2018, Results are in chart; colonoscopy with normal results         Surgical History:     NONE         Family History:         Positive for Coronary Artery Disease ( father ).      Positive for Type 2 Diabetes ( mother ).          Social History:     Occupation: a factory     Marital Status:      Children: 3 children     ALONSO denies any current form of exercise.          Tobacco/Alcohol/Supplements:     Last Reviewed on 11/26/2019 05:27 PM by Spurling, Sarah C    Tobacco: He has never smoked.  Non-drinker     Caffeine:  He admits to consuming caffeine via coffee ( 3 servings per day ) and tea ( 1 serving per day ).          Substance Abuse History:     Last Reviewed on 1/24/2019 09:57 AM by Cristina Perez    NEGATIVE         Mental Health History:     Last Reviewed on 1/24/2019 09:57 AM by Cristina Perez        Communicable Diseases (eg STDs):     Last Reviewed on 1/24/2019 09:57 AM by Cristina Perez        Current Problems:     Last Reviewed on 11/26/2019 05:52 PM by Jyoti Mcduffie    Other specified nontoxic goiter    Gastro-esophageal reflux disease without esophagitis    Personal history of pulmonary embolism    Other fatigue    Mixed hyperlipidemia    Vomiting, unspecified    Encounter for screening for malignant neoplasm of prostate        Immunizations:     Influenza Virus Vaccine, unspecified formulation 2/14/2017    Tdap (Tetanus, reduced diph, acellular pertussis) 9/24/2018        Allergies:     Last Reviewed on 5/14/2019 06:06 PM by Pao Ponce    Sulfonamides:      Phenobarbital:      Neosporin:      hydrogen peroxide:          Current Medications:     Last Reviewed on 11/26/2019 05:28 PM by Spurling, Sarah C    Omeprazole 40mg  Capsules, Extended Release [Take 1 capsule(s) by mouth daily]    Allegra Allergy 24 Hour 180mg Tablet [Take 1 tablet(s) by mouth daily]    Lipitor 10mg Tablet [Take 1 tablet(s) by mouth daily]    Aspirin (ASA) 81mg Tablets, Enteric Coated [1 tab daily]    COQ10 Q DAY    VITAMIN B COMPLEX    VITAMIN D    Sudafed PRN        Objective:        Vitals:         Historical:     5/14/2019  BP:   133/67 mm Hg ( (left arm, , sitting, );) 5/14/2019  Wt:   277.8lbs    Current: 11/26/2019 5:30:26 PM    Ht:  5 ft, 10.75 in;  Wt: 271.2 lbs;  BMI: 38.1T: 98.3 F (oral);  BP: 140/76 mm Hg (left arm, sitting);  P: 90 bpm (left arm (BP Cuff), sitting);  sCr: 0.78 mg/dL;  GFR: 111.42        Repeat:     6:1:44 PM  BP:   134/76mm Hg (left arm, sitting)     Exams:     PHYSICAL EXAM:     GENERAL: obese;  no apparent distress;     NECK: thyroid is non-palpable;     RESPIRATORY: normal respiratory rate and pattern with no distress; normal breath sounds with no rales, rhonchi, wheezes or rubs;     CARDIOVASCULAR: normal rate; rhythm is regular;  no edema;     GENITOURINARY: prostate: DECLINES     MUSCULOSKELETAL:  Normal range of motion, strength and tone;     NEUROLOGIC: mental status: alert and oriented x 3; GROSSLY INTACT     PSYCHIATRIC:  appropriate affect and demeanor; normal speech pattern; grossly normal memory;         Assessment:         K21.9   Gastro-esophageal reflux disease without esophagitis       E78.2   Mixed hyperlipidemia       Z12.5   Encounter for screening for malignant neoplasm of prostate           ORDERS:         Meds Prescribed:       [Refilled] Omeprazole 40 mg oral capsule,delayed release (enteric coated) [Take 1 capsule(s) by mouth daily], #90 (ninety) capsules, Refills: 1 (one)       [Refilled] Lipitor 10 mg oral tablet [Take 1 tablet(s) by mouth daily], #90 (ninety) tablets, Refills: 0 (zero)         Radiology/Test Orders:       3017F  Colorectal CA screen results documented and reviewed (PV)  (In-House)               Lab Orders:       [id number]  [facility] Comp. Metabolic Panel  (Send-Out)            46386  [facility] Lipid Panel  (Send-Out)            *  [employer] screening PSA  (Send-Out)                      Plan:         Gastro-esophageal reflux disease without esophagitis        RECOMMENDATIONS given include: discontinuation (or at least limitation) of alcohol, elevation of the head of the bed, avoidance of spicy foods, weight loss, and avoid eating 3-4 hours before bed.  MIPS Vaccines Flu and Pneumonia updated in Shot record Colorectal Cancer Screening is up to date and the results are in the chart           Prescriptions:       [Refilled] Omeprazole 40 mg oral capsule,delayed release (enteric coated) [Take 1 capsule(s) by mouth daily], #90 (ninety) capsules, Refills: 1 (one)           Orders:       3017F  Colorectal CA screen results documented and reviewed (PV)  (In-House)              Mixed hyperlipidemia    LABORATORY:  Labs ordered to be performed today include Comprehensive metabolic panel and lipid panel.      RECOMMENDATIONS given include: alcohol avoidance, exercise, low cholesterol/low fat diet, and weight loss.            Prescriptions:       [Refilled] Lipitor 10 mg oral tablet [Take 1 tablet(s) by mouth daily], #90 (ninety) tablets, Refills: 0 (zero)           Orders:       [id number]  [facility] Comp. Metabolic Panel  (Send-Out)            64159  [facility] Lipid Panel  (Send-Out)              Encounter for screening for malignant neoplasm of prostate    LABORATORY:  Labs ordered to be performed today include PSA.            Orders:       *  [employer] screening PSA  (Send-Out)                  Patient Recommendations:        For  Gastro-esophageal reflux disease without esophagitis:    Avoid alcohol. Elevate the head of the bed slightly; this can be done by placing a 2x4 piece of lumbar under the feet of the bed frame. Avoid spicy foods if they tend to make your symptoms worse. Lose weight.  Avoid eating 3-4 hours before bedtime.          For  Mixed hyperlipidemia:    Avoid alcohol as it can contribute to elevated blood pressure. Maintain a regular exercise program. Reduce the amount of cholesterol and saturated fat in your diet. Try to lose some weight; even modest weight reduction can improve your blood pressure.              Charge Capture:         Primary Diagnosis:     K21.9  Gastro-esophageal reflux disease without esophagitis           Orders:      07984  Office/outpatient visit; established patient, level 4  (In-House)            3017F  Colorectal CA screen results documented and reviewed (PV)  (In-House)              E78.2  Mixed hyperlipidemia     Z12.5  Encounter for screening for malignant neoplasm of prostate

## 2021-05-18 NOTE — PROGRESS NOTES
Cm Yemi PAMELA 1955     Office/Outpatient Visit    Visit Date: Tue, Oct 2, 2018 05:33 pm    Provider: Jyoti Mcduffie N.P. (Assistant: Pao Ponce MA)    Location: [facility]        Electronically signed by Jyoti Mcduffie N.P. on  10/04/2018 02:56:34 PM                             SUBJECTIVE:        CC:     Fabian is a 62 year old White male.  suture removal (11) to Right thumb, done at [facility] ER         HPI:         9-24-18 at work.  grabbed piece of steel and rec'd cut right thumb.  went to [facility] ER.  rec'd sutures, augmentin, and tetanus booster.  doing well.  denies problems.  here for suture removal.      ROS:     CONSTITUTIONAL:  Negative for chills, fatigue, fever, and weight change.      CARDIOVASCULAR:  Negative for chest pain, palpitations, tachycardia, orthopnea, and edema.      RESPIRATORY:  Negative for cough, dyspnea, and hemoptysis.      GASTROINTESTINAL:  Negative for abdominal pain, heartburn, constipation, diarrhea, and stool changes.      MUSCULOSKELETAL:  Negative for arthralgias, back pain, and myalgias.      INTEGUMENTARY:  Positive for sutures right thumb.      NEUROLOGICAL:  Negative for dizziness, headaches, paresthesias, and weakness.          PMH/FMH/SH:     Last Reviewed on 1/23/2018 10:41 AM by Vahe Jones    Past Medical History:             PAST MEDICAL HISTORY     normal heart echo 2017     Fracture(s): left foot fx;     Hospitalizations: bilateral PE, 5-31-16 [facility] MAINTENANCE         EGD- feb 2018- stomach polyps , erosive gastritis     COLORECTAL CANCER SCREENING: Up to date (colonoscopy q10y; sigmoidoscopy q5y; Cologuard q3y) was last done 03/2018, Results are in chart; colonoscopy with normal results         Surgical History:     NONE         Family History:         Positive for Coronary Artery Disease ( father ).      Positive for Type 2 Diabetes ( mother ).          Social History:     Occupation: a factory      Marital Status:      Children: 3 children     ALONSO denies any current form of exercise.          Tobacco/Alcohol/Supplements:     Last Reviewed on 8/28/2018 05:52 PM by Spurling, Sarah C    Tobacco: He has never smoked.  Non-drinker     Caffeine:  He admits to consuming caffeine via coffee ( 3 servings per day ) and tea ( 1 serving per day ).          Substance Abuse History:     Last Reviewed on 1/23/2018 10:41 AM by Vahe Jones    NEGATIVE         Mental Health History:     Last Reviewed on 1/23/2018 10:41 AM by Vahe Jones        Communicable Diseases (eg STDs):     Last Reviewed on 1/23/2018 10:41 AM by Vahe Jones            Current Problems:     Last Reviewed on 1/23/2018 10:41 AM by Vahe Jones    Vomiting     GERD     Hypercholesterolemia     Fatigue     History of pulmonary embolism     Thyromegaly     Allergies     Blood pressure elevation without a diagnosis of HTN         Immunizations:     Influenza Virus Vaccine, unspecified formulation 2/14/2017     Tdap (Tetanus, reduced diph, acellular pertussis) 9/24/2018         Allergies:     Last Reviewed on 8/28/2018 05:52 PM by Spurling, Sarah C    Sulfonamides:    Phenobarbital:    Neosporin:    hydrogen peroxide:        Current Medications:     Last Reviewed on 8/28/2018 05:57 PM by Spurling, Sarah C    Allegra Allergy 24 Hour 180mg Tablet Take 1 tablet(s) by mouth daily     Lipitor 10mg Tablet Take 1 tablet(s) by mouth daily     Omeprazole 40mg Capsules, Extended Release Take 1 capsule(s) by mouth daily     Aspirin (ASA) 81mg Tablets, Enteric Coated 1 tab daily         OBJECTIVE:        Vitals:         Current: 10/2/2018 5:38:35 PM    Ht:  5 ft, 10.75 in;  Wt: 271.6 lbs;  BMI: 38.1    T: 98.5 F (oral);  BP: 139/72 mm Hg (left arm, sitting);  P: 92 bpm (right arm (BP Cuff), sitting);  sCr: 0.75 mg/dL;  GFR: 118.91        Exams:     PHYSICAL EXAM:     GENERAL: no apparent distress;     RESPIRATORY: normal respiratory rate  and pattern with no distress; normal breath sounds with no rales, rhonchi, wheezes or rubs;     CARDIOVASCULAR: normal rate; rhythm is regular;     SKIN: sutures right thumb -  edges well approximated and negative for erythema;     MUSCULOSKELETAL:  Normal range of motion, strength and tone;     NEUROLOGIC: mental status: alert and oriented x 3; GROSSLY INTACT     PSYCHIATRIC:  appropriate affect and demeanor; normal speech pattern; grossly normal memory;         Procedures:     Encounter for removal of sutures     Sutures were removed today without difficulty.  The area is healed without signs of infection. applied one steri strip             ASSESSMENT           V58.32   Z48.02  Encounter for removal of sutures              DDx:         PLAN:          Encounter for removal of sutures         RECOMMENDATIONS given include: allow steri strip to peel away naturally.  may reapply steri strip prn over the next 3-5 days.  follow up for any persisting concerns..              CHARGE CAPTURE           **Please note: ICD descriptions below are intended for billing purposes only and may not represent clinical diagnoses**        Primary Diagnosis:         V58.32 Encounter for removal of sutures            Z48.02    Encounter for removal of sutures              Orders:          [id number]   Office/outpatient visit; established patient, level 3  (In-House)

## 2021-07-01 VITALS
SYSTOLIC BLOOD PRESSURE: 142 MMHG | DIASTOLIC BLOOD PRESSURE: 88 MMHG | WEIGHT: 255 LBS | TEMPERATURE: 98.9 F | BODY MASS INDEX: 35.7 KG/M2 | HEART RATE: 100 BPM | HEIGHT: 71 IN

## 2021-07-01 VITALS
HEIGHT: 71 IN | TEMPERATURE: 98.3 F | WEIGHT: 271.2 LBS | DIASTOLIC BLOOD PRESSURE: 76 MMHG | BODY MASS INDEX: 37.97 KG/M2 | HEART RATE: 90 BPM | SYSTOLIC BLOOD PRESSURE: 134 MMHG

## 2021-07-01 VITALS
BODY MASS INDEX: 37.1 KG/M2 | HEIGHT: 71 IN | DIASTOLIC BLOOD PRESSURE: 69 MMHG | OXYGEN SATURATION: 91 % | WEIGHT: 265 LBS | SYSTOLIC BLOOD PRESSURE: 154 MMHG | HEART RATE: 83 BPM | TEMPERATURE: 98.2 F

## 2021-07-01 VITALS
DIASTOLIC BLOOD PRESSURE: 72 MMHG | SYSTOLIC BLOOD PRESSURE: 130 MMHG | HEIGHT: 71 IN | HEART RATE: 76 BPM | BODY MASS INDEX: 35.85 KG/M2 | TEMPERATURE: 98.8 F | WEIGHT: 256.1 LBS

## 2021-07-01 VITALS
WEIGHT: 270.8 LBS | TEMPERATURE: 98.3 F | HEART RATE: 86 BPM | DIASTOLIC BLOOD PRESSURE: 80 MMHG | HEIGHT: 71 IN | SYSTOLIC BLOOD PRESSURE: 138 MMHG | BODY MASS INDEX: 37.91 KG/M2

## 2021-07-01 VITALS
WEIGHT: 256 LBS | HEART RATE: 86 BPM | DIASTOLIC BLOOD PRESSURE: 85 MMHG | SYSTOLIC BLOOD PRESSURE: 135 MMHG | BODY MASS INDEX: 35.84 KG/M2 | HEIGHT: 71 IN | TEMPERATURE: 98.3 F

## 2021-07-01 VITALS
TEMPERATURE: 98.5 F | HEART RATE: 92 BPM | SYSTOLIC BLOOD PRESSURE: 139 MMHG | WEIGHT: 271.6 LBS | DIASTOLIC BLOOD PRESSURE: 72 MMHG | BODY MASS INDEX: 38.02 KG/M2 | HEIGHT: 71 IN

## 2021-07-01 VITALS
HEART RATE: 78 BPM | HEIGHT: 71 IN | BODY MASS INDEX: 37.74 KG/M2 | DIASTOLIC BLOOD PRESSURE: 74 MMHG | WEIGHT: 269.6 LBS | TEMPERATURE: 98.1 F | SYSTOLIC BLOOD PRESSURE: 164 MMHG

## 2021-07-01 VITALS
TEMPERATURE: 98.4 F | BODY MASS INDEX: 38.89 KG/M2 | SYSTOLIC BLOOD PRESSURE: 133 MMHG | HEIGHT: 71 IN | WEIGHT: 277.8 LBS | DIASTOLIC BLOOD PRESSURE: 67 MMHG | HEART RATE: 90 BPM

## 2021-07-02 VITALS
HEIGHT: 71 IN | DIASTOLIC BLOOD PRESSURE: 66 MMHG | TEMPERATURE: 98 F | BODY MASS INDEX: 38.61 KG/M2 | SYSTOLIC BLOOD PRESSURE: 132 MMHG | HEART RATE: 90 BPM | WEIGHT: 275.8 LBS

## 2021-08-09 RX ORDER — ATORVASTATIN CALCIUM 10 MG/1
TABLET, FILM COATED ORAL
Qty: 30 TABLET | Refills: 0 | Status: SHIPPED | OUTPATIENT
Start: 2021-08-09 | End: 2021-08-18 | Stop reason: SDUPTHER

## 2021-08-18 ENCOUNTER — OFFICE VISIT (OUTPATIENT)
Dept: FAMILY MEDICINE CLINIC | Age: 66
End: 2021-08-18

## 2021-08-18 ENCOUNTER — LAB (OUTPATIENT)
Dept: LAB | Facility: HOSPITAL | Age: 66
End: 2021-08-18

## 2021-08-18 VITALS
DIASTOLIC BLOOD PRESSURE: 88 MMHG | BODY MASS INDEX: 44.1 KG/M2 | HEART RATE: 97 BPM | HEIGHT: 71 IN | SYSTOLIC BLOOD PRESSURE: 160 MMHG | TEMPERATURE: 98.7 F | WEIGHT: 315 LBS

## 2021-08-18 DIAGNOSIS — M25.50 ARTHRALGIA, UNSPECIFIED JOINT: ICD-10-CM

## 2021-08-18 DIAGNOSIS — Z86.711 HISTORY OF PULMONARY EMBOLISM: Primary | ICD-10-CM

## 2021-08-18 DIAGNOSIS — E78.2 MIXED HYPERLIPIDEMIA: ICD-10-CM

## 2021-08-18 DIAGNOSIS — H61.21 IMPACTED CERUMEN OF RIGHT EAR: ICD-10-CM

## 2021-08-18 DIAGNOSIS — R42 LIGHTHEADEDNESS: ICD-10-CM

## 2021-08-18 DIAGNOSIS — R73.03 PREDIABETES: ICD-10-CM

## 2021-08-18 DIAGNOSIS — I10 ESSENTIAL HYPERTENSION: ICD-10-CM

## 2021-08-18 DIAGNOSIS — K29.70 GASTRITIS DETERMINED BY ENDOSCOPY: ICD-10-CM

## 2021-08-18 LAB
ALBUMIN SERPL-MCNC: 3.8 G/DL (ref 3.5–5.2)
ALBUMIN/GLOB SERPL: 1.1 G/DL
ALP SERPL-CCNC: 54 U/L (ref 39–117)
ALT SERPL W P-5'-P-CCNC: 32 U/L (ref 1–41)
ANION GAP SERPL CALCULATED.3IONS-SCNC: 11.5 MMOL/L (ref 5–15)
AST SERPL-CCNC: 24 U/L (ref 1–40)
BASOPHILS # BLD AUTO: 0.03 10*3/MM3 (ref 0–0.2)
BASOPHILS NFR BLD AUTO: 0.4 % (ref 0–1.5)
BILIRUB SERPL-MCNC: 0.2 MG/DL (ref 0–1.2)
BUN SERPL-MCNC: 15 MG/DL (ref 8–23)
BUN/CREAT SERPL: 16.5 (ref 7–25)
CALCIUM SPEC-SCNC: 9.1 MG/DL (ref 8.6–10.5)
CHLORIDE SERPL-SCNC: 106 MMOL/L (ref 98–107)
CHOLEST SERPL-MCNC: 175 MG/DL (ref 0–200)
CHROMATIN AB SERPL-ACNC: <10 IU/ML (ref 0–14)
CK SERPL-CCNC: 68 U/L (ref 20–200)
CO2 SERPL-SCNC: 22.5 MMOL/L (ref 22–29)
CREAT SERPL-MCNC: 0.91 MG/DL (ref 0.76–1.27)
CRP SERPL-MCNC: 0.52 MG/DL (ref 0–0.5)
DEPRECATED RDW RBC AUTO: 45.4 FL (ref 37–54)
EOSINOPHIL # BLD AUTO: 0.13 10*3/MM3 (ref 0–0.4)
EOSINOPHIL NFR BLD AUTO: 1.6 % (ref 0.3–6.2)
ERYTHROCYTE [DISTWIDTH] IN BLOOD BY AUTOMATED COUNT: 13 % (ref 12.3–15.4)
ERYTHROCYTE [SEDIMENTATION RATE] IN BLOOD: 41 MM/HR (ref 0–20)
GFR SERPL CREATININE-BSD FRML MDRD: 84 ML/MIN/1.73
GLOBULIN UR ELPH-MCNC: 3.6 GM/DL
GLUCOSE SERPL-MCNC: 94 MG/DL (ref 65–99)
HBA1C MFR BLD: 5.4 % (ref 4.8–5.6)
HCT VFR BLD AUTO: 45.7 % (ref 37.5–51)
HDLC SERPL-MCNC: 52 MG/DL (ref 40–60)
HGB BLD-MCNC: 15.2 G/DL (ref 13–17.7)
IMM GRANULOCYTES # BLD AUTO: 0.01 10*3/MM3 (ref 0–0.05)
IMM GRANULOCYTES NFR BLD AUTO: 0.1 % (ref 0–0.5)
LDLC SERPL CALC-MCNC: 100 MG/DL (ref 0–100)
LDLC/HDLC SERPL: 1.86 {RATIO}
LYMPHOCYTES # BLD AUTO: 2.54 10*3/MM3 (ref 0.7–3.1)
LYMPHOCYTES NFR BLD AUTO: 31.3 % (ref 19.6–45.3)
MCH RBC QN AUTO: 31.4 PG (ref 26.6–33)
MCHC RBC AUTO-ENTMCNC: 33.3 G/DL (ref 31.5–35.7)
MCV RBC AUTO: 94.4 FL (ref 79–97)
MONOCYTES # BLD AUTO: 0.84 10*3/MM3 (ref 0.1–0.9)
MONOCYTES NFR BLD AUTO: 10.3 % (ref 5–12)
NEUTROPHILS NFR BLD AUTO: 4.57 10*3/MM3 (ref 1.7–7)
NEUTROPHILS NFR BLD AUTO: 56.3 % (ref 42.7–76)
PLATELET # BLD AUTO: 197 10*3/MM3 (ref 140–450)
PMV BLD AUTO: 8.8 FL (ref 6–12)
POTASSIUM SERPL-SCNC: 4.2 MMOL/L (ref 3.5–5.2)
PROT SERPL-MCNC: 7.4 G/DL (ref 6–8.5)
RBC # BLD AUTO: 4.84 10*6/MM3 (ref 4.14–5.8)
SODIUM SERPL-SCNC: 140 MMOL/L (ref 136–145)
TRIGL SERPL-MCNC: 131 MG/DL (ref 0–150)
URATE SERPL-MCNC: 4.6 MG/DL (ref 3.4–7)
VLDLC SERPL-MCNC: 23 MG/DL (ref 5–40)
WBC # BLD AUTO: 8.12 10*3/MM3 (ref 3.4–10.8)

## 2021-08-18 PROCEDURE — 80061 LIPID PANEL: CPT | Performed by: NURSE PRACTITIONER

## 2021-08-18 PROCEDURE — 86038 ANTINUCLEAR ANTIBODIES: CPT

## 2021-08-18 PROCEDURE — 80053 COMPREHEN METABOLIC PANEL: CPT

## 2021-08-18 PROCEDURE — 86200 CCP ANTIBODY: CPT

## 2021-08-18 PROCEDURE — 36415 COLL VENOUS BLD VENIPUNCTURE: CPT

## 2021-08-18 PROCEDURE — 85652 RBC SED RATE AUTOMATED: CPT

## 2021-08-18 PROCEDURE — 85025 COMPLETE CBC W/AUTO DIFF WBC: CPT

## 2021-08-18 PROCEDURE — 69209 REMOVE IMPACTED EAR WAX UNI: CPT | Performed by: NURSE PRACTITIONER

## 2021-08-18 PROCEDURE — 86431 RHEUMATOID FACTOR QUANT: CPT

## 2021-08-18 PROCEDURE — 99214 OFFICE O/P EST MOD 30 MIN: CPT | Performed by: NURSE PRACTITIONER

## 2021-08-18 PROCEDURE — 82550 ASSAY OF CK (CPK): CPT

## 2021-08-18 PROCEDURE — 83036 HEMOGLOBIN GLYCOSYLATED A1C: CPT

## 2021-08-18 PROCEDURE — 86140 C-REACTIVE PROTEIN: CPT

## 2021-08-18 PROCEDURE — 84550 ASSAY OF BLOOD/URIC ACID: CPT

## 2021-08-18 RX ORDER — FEXOFENADINE HCL 180 MG/1
180 TABLET ORAL DAILY
COMMUNITY
End: 2021-11-30

## 2021-08-18 RX ORDER — LISINOPRIL 10 MG/1
10 TABLET ORAL DAILY
Qty: 30 TABLET | Refills: 5 | Status: SHIPPED | OUTPATIENT
Start: 2021-08-18 | End: 2022-02-18

## 2021-08-18 RX ORDER — OMEPRAZOLE 40 MG/1
40 CAPSULE, DELAYED RELEASE ORAL DAILY
Qty: 90 CAPSULE | Refills: 1 | Status: SHIPPED | OUTPATIENT
Start: 2021-08-18 | End: 2022-05-20

## 2021-08-18 RX ORDER — OMEPRAZOLE 40 MG/1
40 CAPSULE, DELAYED RELEASE ORAL DAILY
COMMUNITY
End: 2021-08-18 | Stop reason: SDUPTHER

## 2021-08-18 RX ORDER — ATORVASTATIN CALCIUM 10 MG/1
TABLET, FILM COATED ORAL
COMMUNITY
End: 2021-08-18 | Stop reason: SDUPTHER

## 2021-08-18 RX ORDER — ATORVASTATIN CALCIUM 10 MG/1
10 TABLET, FILM COATED ORAL DAILY
Qty: 90 TABLET | Refills: 1 | Status: SHIPPED | OUTPATIENT
Start: 2021-08-18 | End: 2022-03-14

## 2021-08-18 RX ORDER — ASPIRIN 81 MG/1
TABLET ORAL
COMMUNITY

## 2021-08-18 RX ORDER — LORATADINE 10 MG/1
TABLET ORAL
COMMUNITY
End: 2021-08-18 | Stop reason: SDUPTHER

## 2021-08-18 NOTE — ASSESSMENT & PLAN NOTE
Avoid Sudafed, no more than 2000 mg of sodium per day.  Monitor blood pressure at home.  Want to see blood pressure 130s over 80s or lower.  To start lisinopril 10 mg once daily which will also help preserve kidney function.  Report dry cough if it were to occur.  Follow-up if not improving within 3 to 4 weeks.

## 2021-08-18 NOTE — PROGRESS NOTES
"Chief Complaint  Hyperlipidemia    Subjective  Yemi is a 65-year-old male here today for follow-up on hyperlipidemia.  He takes atorvastatin 10 mg at bedtime.  He denies side effects and requests refills.  Is fasting and here for routine lab work today.  Regarding acid reflux he had erosive gastritis noted per endoscopy in 2018.  He has taken omeprazole 40 mg daily since that time and symptoms are stable.  Denies side effects and requests refills.  He is concerned he is starting to show signs of hypertension and states he feels as if he is ready to start treatment for hypertension.  He states he feels just a little funny every now and then without any headaches or unilateral weakness or numbness.  He is worried about either his blood pressure or his blood sugar.  He sees cardiology Dr. Gary who is an associate of [facility] here in [place] once per year.  He has never been treated for hypertension.        Yemi Pabon presents to [facility]    Review of Systems   Constitutional: Negative.    HENT: Negative.    Respiratory: Negative.    Cardiovascular: Negative.    Musculoskeletal:        Chronic back pain stable and arthralgias persistent but stable mainly of hands and knees   Neurological: Positive for light-headedness. Negative for dizziness, tremors, seizures, syncope, facial asymmetry, speech difficulty, weakness and numbness.        Brief intermittent lightheadedness without chest pain vision changes or headache   Psychiatric/Behavioral: Negative.  Sleep disturbance: lipid.        Objective   Vital Signs:   Vitals:    08/18/21 1256 08/18/21 1301 08/18/21 1333   BP: 151/85 166/84 160/88   BP Location: Left arm Left arm Left arm   Patient Position: Sitting Sitting Sitting   Cuff Size:   Large Adult   Pulse: 97     Temp: 98.7 °F (37.1 °C)     TempSrc: Oral     Weight: (!) 145 kg (320 lb 6.4 oz)     Height: 179.7 cm (70.75\")        Physical Exam  Vitals reviewed. "   Constitutional:       General: He is not in acute distress.     Appearance: Normal appearance. He is well-developed. He is obese.   HENT:      Right Ear: There is impacted cerumen.      Left Ear: Tympanic membrane normal.   Cardiovascular:      Rate and Rhythm: Normal rate and regular rhythm.      Heart sounds: Normal heart sounds.   Pulmonary:      Effort: Pulmonary effort is normal.      Breath sounds: Normal breath sounds.   Musculoskeletal:      Right lower leg: No edema.      Left lower leg: No edema.   Skin:     General: Skin is warm and dry.   Neurological:      General: No focal deficit present.      Mental Status: He is alert.   Psychiatric:         Attention and Perception: Attention normal.         Mood and Affect: Mood and affect normal.         Behavior: Behavior normal.          Result Review :           Ear Cerumen Removal    Date/Time: 8/18/2021 1:30 PM  Performed by: Fabby Mcmillan LPN  Authorized by: Jyoti Mcduffie APRN     Anesthesia:  Local Anesthetic: none  Location details: right ear  Patient tolerance: patient tolerated the procedure well with no immediate complications  Procedure type: irrigation   Sedation:  Patient sedated: no             Assessment and Plan    Diagnoses and all orders for this visit:    1. History of pulmonary embolism (Primary)    2. Mixed hyperlipidemia  -     Comprehensive metabolic panel; Future  -     Lipid Panel  -     atorvastatin (LIPITOR) 10 MG tablet; Take 1 tablet by mouth Daily.  Dispense: 90 tablet; Refill: 1    3. Gastritis determined by endoscopy  -     omeprazole (priLOSEC) 40 MG capsule; Take 1 capsule by mouth Daily.  Dispense: 90 capsule; Refill: 1    4. Prediabetes  -     Hemoglobin A1c; Future    5. Impacted cerumen of right ear    6. Essential hypertension  Assessment & Plan:  Avoid Sudafed, no more than 2000 mg of sodium per day.  Monitor blood pressure at home.  Want to see blood pressure 130s over 80s or lower.  To start lisinopril 10 mg once  daily which will also help preserve kidney function.  Report dry cough if it were to occur.  Follow-up if not improving within 3 to 4 weeks.    Orders:  -     Comprehensive metabolic panel; Future  -     Lipid Panel  -     lisinopril (PRINIVIL,ZESTRIL) 10 MG tablet; Take 1 tablet by mouth Daily.  Dispense: 30 tablet; Refill: 5    7. Arthralgia, unspecified joint  Assessment & Plan:  Lab work is ordered and results are pending.  Consider x-ray of affected areas if lab work is normal    Orders:  -     TEE Direct Reflex to 11 Biomarker; Future  -     CK; Future  -     C-reactive protein; Future  -     Rheumatoid factor; Future  -     Sedimentation rate; Future  -     Cyclic citrul peptide antibody, IgG/IgA; Future  -     Uric acid; Future    8. Lightheadedness  -     CBC w AUTO Differential; Future  .proc    Follow Up    No follow-ups on file.  Patient was given instructions and counseling regarding his condition or for health maintenance advice. Please see specific information pulled into the AVS if appropriate.

## 2021-08-18 NOTE — ASSESSMENT & PLAN NOTE
Lab work is ordered and results are pending.  Consider x-ray of affected areas if lab work is normal

## 2021-08-20 LAB — ANA SER QL: NEGATIVE

## 2021-08-21 LAB — CCP IGA+IGG SERPL IA-ACNC: 17 UNITS (ref 0–19)

## 2021-08-26 NOTE — PROGRESS NOTES
Is not anemic.  Blood sugar under good control.  No sign of autoimmune arthritis or gout.  Generalized non specific inflammation.  Normal kidney and liver function.

## 2021-11-30 RX ORDER — FEXOFENADINE HCL 180 MG/1
TABLET ORAL
Qty: 90 TABLET | Refills: 0 | Status: SHIPPED | OUTPATIENT
Start: 2021-11-30 | End: 2023-03-27

## 2022-02-18 DIAGNOSIS — I10 ESSENTIAL HYPERTENSION: ICD-10-CM

## 2022-02-18 RX ORDER — LISINOPRIL 10 MG/1
TABLET ORAL
Qty: 30 TABLET | Refills: 0 | Status: SHIPPED | OUTPATIENT
Start: 2022-02-18 | End: 2022-03-21

## 2022-03-02 ENCOUNTER — LAB (OUTPATIENT)
Dept: LAB | Facility: HOSPITAL | Age: 67
End: 2022-03-02

## 2022-03-02 ENCOUNTER — OFFICE VISIT (OUTPATIENT)
Dept: FAMILY MEDICINE CLINIC | Age: 67
End: 2022-03-02

## 2022-03-02 VITALS
WEIGHT: 315 LBS | HEIGHT: 71 IN | OXYGEN SATURATION: 96 % | DIASTOLIC BLOOD PRESSURE: 78 MMHG | BODY MASS INDEX: 44.1 KG/M2 | SYSTOLIC BLOOD PRESSURE: 144 MMHG | HEART RATE: 98 BPM

## 2022-03-02 DIAGNOSIS — Z12.5 PROSTATE CANCER SCREENING: ICD-10-CM

## 2022-03-02 DIAGNOSIS — R73.03 PREDIABETES: ICD-10-CM

## 2022-03-02 DIAGNOSIS — E78.2 MIXED HYPERLIPIDEMIA: ICD-10-CM

## 2022-03-02 DIAGNOSIS — Z00.00 WELL ADULT EXAM: Primary | ICD-10-CM

## 2022-03-02 DIAGNOSIS — K29.70 GASTRITIS DETERMINED BY ENDOSCOPY: ICD-10-CM

## 2022-03-02 DIAGNOSIS — I10 ESSENTIAL HYPERTENSION: ICD-10-CM

## 2022-03-02 DIAGNOSIS — H61.22 IMPACTED CERUMEN OF LEFT EAR: ICD-10-CM

## 2022-03-02 PROBLEM — R60.9 EDEMA: Status: ACTIVE | Noted: 2020-03-18

## 2022-03-02 PROBLEM — H61.21 IMPACTED CERUMEN OF RIGHT EAR: Status: RESOLVED | Noted: 2021-08-18 | Resolved: 2022-03-02

## 2022-03-02 PROBLEM — R42 LIGHTHEADEDNESS: Status: RESOLVED | Noted: 2021-08-18 | Resolved: 2022-03-02

## 2022-03-02 LAB
ALBUMIN SERPL-MCNC: 3.7 G/DL (ref 3.5–5.2)
ALBUMIN/GLOB SERPL: 0.9 G/DL
ALP SERPL-CCNC: 65 U/L (ref 39–117)
ALT SERPL W P-5'-P-CCNC: 35 U/L (ref 1–41)
ANION GAP SERPL CALCULATED.3IONS-SCNC: 11.7 MMOL/L (ref 5–15)
AST SERPL-CCNC: 20 U/L (ref 1–40)
BILIRUB SERPL-MCNC: 0.3 MG/DL (ref 0–1.2)
BUN SERPL-MCNC: 14 MG/DL (ref 8–23)
BUN/CREAT SERPL: 13.3 (ref 7–25)
CALCIUM SPEC-SCNC: 9.3 MG/DL (ref 8.6–10.5)
CHLORIDE SERPL-SCNC: 106 MMOL/L (ref 98–107)
CHOLEST SERPL-MCNC: 150 MG/DL (ref 0–200)
CO2 SERPL-SCNC: 22.3 MMOL/L (ref 22–29)
CREAT SERPL-MCNC: 1.05 MG/DL (ref 0.76–1.27)
EGFRCR SERPLBLD CKD-EPI 2021: 78.3 ML/MIN/1.73
GLOBULIN UR ELPH-MCNC: 3.9 GM/DL
GLUCOSE SERPL-MCNC: 104 MG/DL (ref 65–99)
HBA1C MFR BLD: 5.9 % (ref 4.8–5.6)
HDLC SERPL-MCNC: 45 MG/DL (ref 40–60)
LDLC SERPL CALC-MCNC: 86 MG/DL (ref 0–100)
LDLC/HDLC SERPL: 1.88 {RATIO}
POTASSIUM SERPL-SCNC: 4 MMOL/L (ref 3.5–5.2)
PROT SERPL-MCNC: 7.6 G/DL (ref 6–8.5)
PSA SERPL-MCNC: 0.21 NG/ML (ref 0–4)
SODIUM SERPL-SCNC: 140 MMOL/L (ref 136–145)
TRIGL SERPL-MCNC: 102 MG/DL (ref 0–150)
VLDLC SERPL-MCNC: 19 MG/DL (ref 5–40)

## 2022-03-02 PROCEDURE — 36415 COLL VENOUS BLD VENIPUNCTURE: CPT

## 2022-03-02 PROCEDURE — 83036 HEMOGLOBIN GLYCOSYLATED A1C: CPT

## 2022-03-02 PROCEDURE — 80053 COMPREHEN METABOLIC PANEL: CPT

## 2022-03-02 PROCEDURE — 80061 LIPID PANEL: CPT

## 2022-03-02 PROCEDURE — 1170F FXNL STATUS ASSESSED: CPT | Performed by: NURSE PRACTITIONER

## 2022-03-02 PROCEDURE — 1159F MED LIST DOCD IN RCRD: CPT | Performed by: NURSE PRACTITIONER

## 2022-03-02 PROCEDURE — G0439 PPPS, SUBSEQ VISIT: HCPCS | Performed by: NURSE PRACTITIONER

## 2022-03-02 PROCEDURE — G0103 PSA SCREENING: HCPCS

## 2022-03-02 RX ORDER — ATORVASTATIN CALCIUM 10 MG/1
10 TABLET, FILM COATED ORAL DAILY
Qty: 90 TABLET | Refills: 1 | Status: CANCELLED | OUTPATIENT
Start: 2022-03-02

## 2022-03-02 RX ORDER — ZINC GLUCONATE 50 MG
TABLET ORAL DAILY
COMMUNITY

## 2022-03-02 RX ORDER — LISINOPRIL 10 MG/1
10 TABLET ORAL DAILY
Qty: 90 TABLET | Refills: 1 | Status: CANCELLED | OUTPATIENT
Start: 2022-03-02

## 2022-03-02 NOTE — ASSESSMENT & PLAN NOTE
Earwax removed from the left lower ear canal with curette.  Patient tolerated procedure without difficulty.

## 2022-03-02 NOTE — PROGRESS NOTES
Subsequent Medicare Wellness Visit  The ABC's of Medicare Wellness Visit  Chief Complaint   Patient presents with   • Medicare Wellness-subsequent       Subjective Patient is a 66-year-old male who is here today for a Medicare wellness exam.  He is up-to-date on tetanus vaccination.  Declines pneumonia vaccine, shingles vaccine, and flu vaccine.  He denies any difficulty with starting or stopping his urinary stream or dysuria.  Cannot recall his last eye exam.  Denies vision concerns and uses reading glasses.  Last colonoscopy was in 2018 and normal.  Last upper scope in 2018 noted erosive gastritis and recommendations to continue omeprazole 40 mg daily.  He reports his blood pressures under good control and denies any concerns.  History of Present Illness      Fabian is a 66 y.o. male who presents   for a Subsequent Medicare Wellness Visit.    Does the patient have evidence of cognitive impairment?   No    Asprin use counseling:Does not need ASA (and currently is not on it)    Recent Hospitalizations:  No hospitalization(s) within the last year.    Advanced Care Planning:  ACP discussion was declined by the patient. Patient does not have an advance directive, declines further assistance.    The following portions of the patient's history were reviewed   and updated as appropriate: allergies, current medications, past family history, past medical history, past social history, past surgical history and problem list.    Compared to one year ago, the patient feels his   physical health is the same.  Compared to one year ago, the patient feels his   mental health is the same.    Reviewed chart for potential of high risk medication in the elderly:  yes  Reviewed chart for potential of harmful drug interactions in the elderly:  yes    Patient's Body mass index is 45.37 kg/m². indicating that he is morbidly obese (BMI > 40 or > 35 with obesity - related health condition). Obesity-related health conditions include the  following: hypertension, dyslipidemias and GERD. Obesity is unchanged. BMI is is above average; BMI management plan is completed. We discussed portion control and increasing exercise..       HEALTH RISK ASSESSMENT BEGIN  Fall Risk Screen:  RUBI Fall Risk Assessment was completed, and patient is at LOW risk for falls.Assessment completed on:3/2/2022    Depression Screen:   PHQ-2/PHQ-9 Depression Screening 3/2/2022   Little interest or pleasure in doing things 0   Feeling down, depressed, or hopeless 0   Total Score 0       Current Medical Providers:  Patient Care Team:  Jyoti Mcduffie APRN as PCP - General (Nurse Practitioner)    Smoking Status:  Social History     Tobacco Use   Smoking Status Never Smoker   Smokeless Tobacco Never Used       Alcohol Consumption:  Social History     Substance and Sexual Activity   Alcohol Use Not Currently       Health Habits and Functional and Cognitive Screening:  Functional & Cognitive Status 3/2/2022   Do you have difficulty preparing food and eating? No   Do you have difficulty bathing yourself, getting dressed or grooming yourself? No   Do you have difficulty using the toilet? No   Do you have difficulty moving around from place to place? No   Do you have trouble with steps or getting out of a bed or a chair? Yes   Current Diet Limited Junk Food   Dental Exam Not up to date   Eye Exam Not up to date   Exercise (times per week) 0 times per week   Current Exercises Include No Regular Exercise   Do you need help using the phone?  No   Are you deaf or do you have serious difficulty hearing?  No   Do you need help with transportation? Yes   Do you need help shopping? No   Do you need help preparing meals?  No   Do you need help with housework?  No   Do you need help with laundry? No   Do you need help taking your medications? No   Do you need help managing money? No   Do you ever drive or ride in a car without wearing a seat belt? No   Have you felt unusual stress, anger or  loneliness in the last month? No   Who do you live with? Spouse   If you need help, do you have trouble finding someone available to you? No   Have you been bothered in the last four weeks by sexual problems? No   Do you have difficulty concentrating, remembering or making decisions? No       Age-appropriate Screening Schedule:  Refer to the list below for future screening recommendations based on patient's age, sex and/or medical conditions. Orders for these recommended tests are listed in the plan section. The patient has been provided with a written plan.    Health Maintenance   Topic Date Due   • ZOSTER VACCINE (1 of 2) Never done   • INFLUENZA VACCINE  08/01/2021   • LIPID PANEL  08/18/2022   • TDAP/TD VACCINES (2 - Td or Tdap) 09/24/2028     HEALTH RISK ASSESSMENT END    Outpatient Medications Prior to Visit   Medication Sig Dispense Refill   • aspirin (aspirin) 81 MG EC tablet aspirin 81 mg oral tablet,delayed release (DR/EC) take 1 tablet (81 mg) by oral route once daily   Active     • atorvastatin (LIPITOR) 10 MG tablet Take 1 tablet by mouth Daily. 90 tablet 1   • B Complex Vitamins (B COMPLEX PO) Take  by mouth.     • Coenzyme Q10 (CO Q 10 PO) Take 200 mg by mouth Daily.     • fexofenadine (ALLEGRA) 180 MG tablet TAKE ONE TABLET BY MOUTH DAILY 90 tablet 0   • lisinopril (PRINIVIL,ZESTRIL) 10 MG tablet TAKE ONE TABLET BY MOUTH DAILY 30 tablet 0   • omeprazole (priLOSEC) 40 MG capsule Take 1 capsule by mouth Daily. 90 capsule 1   • Probiotic Product (PROBIOTIC PO) Take  by mouth.     • Zinc 50 MG tablet Take  by mouth.       No facility-administered medications prior to visit.       Patient Active Problem List   Diagnosis   • History of pulmonary embolism   • Gastritis determined by endoscopy   • Mixed hyperlipidemia   • Prediabetes   • Essential hypertension   • Arthralgia   • Edema   • Well adult exam   • Prostate cancer screening   • Impacted cerumen of left ear       Review of Systems   Constitutional:  "Negative.    Respiratory: Negative.    Cardiovascular: Negative.    Genitourinary: Negative.    Neurological: Negative.    Psychiatric/Behavioral: Negative.        Objective      Vitals:    22 1309 22 1401   BP: 155/80 144/78   BP Location: Left arm Right arm   Patient Position: Sitting Sitting   Cuff Size: Large Adult Large Adult   Pulse: 98    SpO2: 96%    Weight: (!) 147 kg (323 lb)    Height: 179.7 cm (70.75\")        Physical Exam  Vitals reviewed.   Constitutional:       General: He is not in acute distress.     Appearance: Normal appearance. He is well-developed.   HENT:      Right Ear: Tympanic membrane normal.      Left Ear: There is impacted cerumen.   Cardiovascular:      Rate and Rhythm: Normal rate and regular rhythm.      Pulses:           Posterior tibial pulses are 2+ on the right side and 2+ on the left side.      Heart sounds: Normal heart sounds.   Pulmonary:      Effort: Pulmonary effort is normal.      Breath sounds: Normal breath sounds.   Musculoskeletal:      Right lower le+ Edema present.      Left lower le+ Edema present.   Skin:     General: Skin is warm and dry.   Neurological:      General: No focal deficit present.      Mental Status: He is alert.   Psychiatric:         Attention and Perception: Attention normal.         Mood and Affect: Mood and affect normal.         Behavior: Behavior normal.             Result Review :           Lab Results - Last 18 Months   Lab Units 21  1408 21  1344 21  1339   BUN mg/dL 15  --  10   CREATININE mg/dL 0.91  --  0.91   EGFR IF NONAFRICN AM mL/min/1.73 84  --   --    SODIUM mmol/L 140  --  137   POTASSIUM mmol/L 4.2  --  4.2   CHLORIDE mmol/L 106  --  104   CALCIUM mg/dL 9.1  --  9.0   ALBUMIN g/dL 3.80  --  3.5   BILIRUBIN mg/dL 0.2  --  0.34   ALK PHOS U/L 54  --  60   AST (SGOT) U/L 24  --  22   ALT (SGPT) U/L 32  --  23   CHOLESTEROL mg/dL  --   --  147   TRIGLYCERIDES mg/dL 131  --  127   HDL CHOL mg/dL 52  " --  46   VLDL CHOL mg/dL 23  --  25   LDL CHOL mg/dL 100  --  76   LDL/HDL RATIO  1.86  --   --    CK TOTAL U/L 68  --   --    HEMOGLOBIN A1C % 5.40 6.0*  --    WBC 10*3/mm3 8.12  --  6.97   RBC 10*6/mm3 4.84  --  4.85   HEMATOCRIT % 45.7  --  46.1   MCV fL 94.4  --  95.1   MCH pg 31.4  --  32.0*   URIC ACID mg/dL 4.6  --   --        The following data was reviewed by: DOMINIQUE Smith on 03/02/2022:    Assessment/Plan   Assessment and Plan      Diagnoses and all orders for this visit:    1. Well adult exam (Primary)  Assessment & Plan:  Courage healthy diet and exercise.  Declines shingles, flu, and pneumonia vaccination.  Declines need for prostate exam.      2. Mixed hyperlipidemia  -     Comprehensive metabolic panel; Future  -     Lipid panel; Future    3. Essential hypertension  Assessment & Plan:  Declines refills today as are not needed.  Want her blood pressure at home.  If readings are never higher than 130s over 80s he is to contact this office.    Orders:  -     Comprehensive metabolic panel; Future  -     Lipid panel; Future    4. Prediabetes  -     Hemoglobin A1c; Future    5. Prostate cancer screening  -     PSA SCREENING; Future    6. Impacted cerumen of left ear  Assessment & Plan:  Earwax removed from the left lower ear canal with curette.  Patient tolerated procedure without difficulty.    Orders:  -     Ear Cerumen Removal    7. Gastritis determined by endoscopy  Assessment & Plan:  No refills needed today.  Continue current treatment.  Further treatment pending lab results.        Medicare Risks and Personalized Health Plan  CMS Preventative Services Quick Reference  Cardiovascular risk  Immunizations Discussed/Encouraged (specific immunizations; Influenza, Pneumococcal 23 and Shingrix )  Obesity/Overweight     The above risks/problems have been discussed with the patient.  Pertinent information has been shared with the patient in the   After Visit Summary. Follow up plans and orders are  seen below   in the Assessment/Plan Section.    I spent 35 minutes caring for Yemi on this date of service. This time includes time spent by me in the following activities:preparing for the visit, reviewing tests, obtaining and/or reviewing a separately obtained history, performing a medically appropriate examination and/or evaluation , counseling and educating the patient/family/caregiver, ordering medications, tests, or procedures and documenting information in the medical record    Follow Up   No follow-ups on file.     An After Visit Summary and PPPS were given to the patient.

## 2022-03-02 NOTE — PATIENT INSTRUCTIONS
Recombinant Zoster (Shingles) Vaccine: What You Need to Know  1. Why get vaccinated?  Recombinant zoster (shingles) vaccine can prevent shingles.  Shingles (also called herpes zoster, or just zoster) is a painful skin rash, usually with blisters. In addition to the rash, shingles can cause fever, headache, chills, or upset stomach. More rarely, shingles can lead to pneumonia, hearing problems, blindness, brain inflammation (encephalitis), or death.  The most common complication of shingles is long-term nerve pain called postherpetic neuralgia (PHN). PHN occurs in the areas where the shingles rash was, even after the rash clears up. It can last for months or years after the rash goes away. The pain from PHN can be severe and debilitating.  About 10 to 18% of people who get shingles will experience PHN. The risk of PHN increases with age. An older adult with shingles is more likely to develop PHN and have longer lasting and more severe pain than a younger person with shingles.  Shingles is caused by the varicella zoster virus, the same virus that causes chickenpox. After you have chickenpox, the virus stays in your body and can cause shingles later in life. Shingles cannot be passed from one person to another, but the virus that causes shingles can spread and cause chickenpox in someone who had never had chickenpox or received chickenpox vaccine.  2. Recombinant shingles vaccine  Recombinant shingles vaccine provides strong protection against shingles. By preventing shingles, recombinant shingles vaccine also protects against PHN.  Recombinant shingles vaccine is the preferred vaccine for the prevention of shingles. However, a different vaccine, live shingles vaccine, may be used in some circumstances.  The recombinant shingles vaccine is recommended for adults 50 years and older without serious immune problems. It is given as a two-dose series.  This vaccine is also recommended for people who have already gotten  another type of shingles vaccine, the live shingles vaccine. There is no live virus in this vaccine.  Shingles vaccine may be given at the same time as other vaccines.  3. Talk with your health care provider  Tell your vaccine provider if the person getting the vaccine:  · Has had an allergic reaction after a previous dose of recombinant shingles vaccine, or has any severe, life-threatening allergies.  · Is pregnant or breastfeeding.  · Is currently experiencing an episode of shingles.  In some cases, your health care provider may decide to postpone shingles vaccination to a future visit.  People with minor illnesses, such as a cold, may be vaccinated. People who are moderately or severely ill should usually wait until they recover before getting recombinant shingles vaccine.  Your health care provider can give you more information.  4. Risks of a vaccine reaction  · A sore arm with mild or moderate pain is very common after recombinant shingles vaccine, affecting about 80% of vaccinated people. Redness and swelling can also happen at the site of the injection.  · Tiredness, muscle pain, headache, shivering, fever, stomach pain, and nausea happen after vaccination in more than half of people who receive recombinant shingles vaccine.  In clinical trials, about 1 out of 6 people who got recombinant zoster vaccine experienced side effects that prevented them from doing regular activities. Symptoms usually went away on their own in 2 to 3 days.  You should still get the second dose of recombinant zoster vaccine even if you had one of these reactions after the first dose.  People sometimes faint after medical procedures, including vaccination. Tell your provider if you feel dizzy or have vision changes or ringing in the ears.  As with any medicine, there is a very remote chance of a vaccine causing a severe allergic reaction, other serious injury, or death.  5. What if there is a serious problem?  An allergic reaction  could occur after the vaccinated person leaves the clinic. If you see signs of a severe allergic reaction (hives, swelling of the face and throat, difficulty breathing, a fast heartbeat, dizziness, or weakness), call 9-1-1 and get the person to the nearest hospital.  For other signs that concern you, call your health care provider.  Adverse reactions should be reported to the Vaccine Adverse Event Reporting System (VAERS). Your health care provider will usually file this report, or you can do it yourself. Visit the VAERS website at www.vaers.[facility].gov or call 1-334.128.2939. VAERS is only for reporting reactions, and VAERS staff do not give medical advice.  6. How can I learn more?  · Ask your health care provider.  · Call your local or state health department.  · Contact the Centers for Disease Control and Prevention (CDC):  ? Call 1-998.891.3237 (5-708-UEL-INFO) or  ? Visit CDC's website at www.cdc.gov/vaccines  Vaccine Information Statement Recombinant Zoster Vaccine (10/30/2019)  This information is not intended to replace advice given to you by your health care provider. Make sure you discuss any questions you have with your health care provider.  Document Revised: 12/09/2020 Document Reviewed: 12/09/2020  [employer] Patient Education © 2021 [employer]    Preventive Care 65 Years and Older, Male  Preventive care refers to lifestyle choices and visits with your health care provider that can promote health and wellness. This includes:  · A yearly physical exam. This is also called an annual well check.  · Regular dental and eye exams.  · Immunizations.  · Screening for certain conditions.  · Healthy lifestyle choices, such as diet and exercise.  What can I expect for my preventive care visit?  Physical exam  Your health care provider will check:  · Height and weight. These may be used to calculate body mass index (BMI), which is a measurement that tells if you are at a healthy weight.  · Heart rate and blood  pressure.  · Your skin for abnormal spots.  Counseling  Your health care provider may ask you questions about:  · Alcohol, tobacco, and drug use.  · Emotional well-being.  · Home and relationship well-being.  · Sexual activity.  · Eating habits.  · History of falls.  · Memory and ability to understand (cognition).  · Work and work environment.  What immunizations do I need?    Influenza (flu) vaccine  · This is recommended every year.  Tetanus, diphtheria, and pertussis (Tdap) vaccine  · You may need a Td booster every 10 years.  Varicella (chickenpox) vaccine  · You may need this vaccine if you have not already been vaccinated.  Zoster (shingles) vaccine  · You may need this after age 60.  Pneumococcal conjugate (PCV13) vaccine  · One dose is recommended after age 65.  Pneumococcal polysaccharide (PPSV23) vaccine  · One dose is recommended after age 65.  Measles, mumps, and rubella (MMR) vaccine  · You may need at least one dose of MMR if you were born in 1957 or later. You may also need a second dose.  Meningococcal conjugate (MenACWY) vaccine  · You may need this if you have certain conditions.  Hepatitis A vaccine  · You may need this if you have certain conditions or if you travel or work in places where you may be exposed to hepatitis A.  Hepatitis B vaccine  · You may need this if you have certain conditions or if you travel or work in places where you may be exposed to hepatitis B.  Haemophilus influenzae type b (Hib) vaccine  · You may need this if you have certain conditions.  You may receive vaccines as individual doses or as more than one vaccine together in one shot (combination vaccines). Talk with your health care provider about the risks and benefits of combination vaccines.  What tests do I need?  Blood tests  · Lipid and cholesterol levels. These may be checked every 5 years, or more frequently depending on your overall health.  · Hepatitis C test.  · Hepatitis B test.  Screening  · Lung cancer  screening. You may have this screening every year starting at age 55 if you have a 30-pack-year history of smoking and currently smoke or have quit within the past 15 years.  · Colorectal cancer screening. All adults should have this screening starting at age 50 and continuing until age 75. Your health care provider may recommend screening at age 45 if you are at increased risk. You will have tests every 1-10 years, depending on your results and the type of screening test.  · Prostate cancer screening. Recommendations will vary depending on your family history and other risks.  · Diabetes screening. This is done by checking your blood sugar (glucose) after you have not eaten for a while (fasting). You may have this done every 1-3 years.  · Abdominal aortic aneurysm (AAA) screening. You may need this if you are a current or former smoker.  · Sexually transmitted disease (STD) testing.  Follow these instructions at home:  Eating and drinking  · Eat a diet that includes fresh fruits and vegetables, whole grains, lean protein, and low-fat dairy products. Limit your intake of foods with high amounts of sugar, saturated fats, and salt.  · Take vitamin and mineral supplements as recommended by your health care provider.  · Do not drink alcohol if your health care provider tells you not to drink.  · If you drink alcohol:  ? Limit how much you have to 0-2 drinks a day.  ? Be aware of how much alcohol is in your drink. In the [place] one drink equals one 12 oz bottle of beer (355 mL), one 5 oz glass of wine (148 mL), or one 1½ oz glass of hard liquor (44 mL).  Lifestyle  · Take daily care of your teeth and gums.  · Stay active. Exercise for at least 30 minutes on 5 or more days each week.  · Do not use any products that contain nicotine or tobacco, such as cigarettes, e-cigarettes, and chewing tobacco. If you need help quitting, ask your health care provider.  · If you are sexually active, practice safe sex. Use a condom or  other form of protection to prevent STIs (sexually transmitted infections).  · Talk with your health care provider about taking a low-dose aspirin or statin.  What's next?  · Visit your health care provider once a year for a well check visit.  · Ask your health care provider how often you should have your eyes and teeth checked.  · Stay up to date on all vaccines.  This information is not intended to replace advice given to you by your health care provider. Make sure you discuss any questions you have with your health care provider.  Document Revised: 12/12/2019 Document Reviewed: 12/12/2019  [employer] Patient Education © 2020 [employer]

## 2022-03-02 NOTE — ASSESSMENT & PLAN NOTE
Declines refills today as are not needed.  Want her blood pressure at home.  If readings are never higher than 130s over 80s he is to contact this office.

## 2022-03-06 NOTE — PROGRESS NOTES
PSA is normal.  Blood sugar is mildly elevated.  You are not diabetic but you are at increased risk of developing diabetes in the future.  Decrease intake of sugar and white breads and pastas.  Lipid panel looks good.  Kidney and liver function are normal.

## 2022-03-14 DIAGNOSIS — E78.2 MIXED HYPERLIPIDEMIA: ICD-10-CM

## 2022-03-14 RX ORDER — ATORVASTATIN CALCIUM 10 MG/1
TABLET, FILM COATED ORAL
Qty: 90 TABLET | Refills: 1 | Status: SHIPPED | OUTPATIENT
Start: 2022-03-14 | End: 2022-07-06 | Stop reason: SDUPTHER

## 2022-03-20 DIAGNOSIS — I10 ESSENTIAL HYPERTENSION: ICD-10-CM

## 2022-03-21 RX ORDER — LISINOPRIL 10 MG/1
TABLET ORAL
Qty: 30 TABLET | Refills: 5 | Status: SHIPPED | OUTPATIENT
Start: 2022-03-21 | End: 2022-09-06 | Stop reason: SDUPTHER

## 2022-05-19 DIAGNOSIS — K29.70 GASTRITIS DETERMINED BY ENDOSCOPY: ICD-10-CM

## 2022-05-20 RX ORDER — OMEPRAZOLE 40 MG/1
CAPSULE, DELAYED RELEASE ORAL
Qty: 90 CAPSULE | Refills: 1 | Status: SHIPPED | OUTPATIENT
Start: 2022-05-20 | End: 2022-11-28

## 2022-07-06 ENCOUNTER — TELEPHONE (OUTPATIENT)
Dept: FAMILY MEDICINE CLINIC | Age: 67
End: 2022-07-06

## 2022-07-06 DIAGNOSIS — E78.2 MIXED HYPERLIPIDEMIA: ICD-10-CM

## 2022-07-06 RX ORDER — ATORVASTATIN CALCIUM 10 MG/1
10 TABLET, FILM COATED ORAL DAILY
Qty: 90 TABLET | Refills: 1 | Status: SHIPPED | OUTPATIENT
Start: 2022-07-06

## 2022-07-06 NOTE — TELEPHONE ENCOUNTER
Caller: Yemi Pabon    Relationship: Self    Best call back number: [phone]    Requested Prescriptions:   Requested Prescriptions     Pending Prescriptions Disp Refills   • atorvastatin (LIPITOR) 10 MG tablet 90 tablet 1     Sig: Take 1 tablet by mouth Daily.        Pharmacy where request should be sent: [facility] [place] - [address]  - [phone]  - [phone] FX         Does the patient have less than a 3 day supply:  [x] Yes  [] No    Aarti Nelson Rep   07/06/22 13:13 EDT

## 2022-09-06 ENCOUNTER — OFFICE VISIT (OUTPATIENT)
Dept: FAMILY MEDICINE CLINIC | Age: 67
End: 2022-09-06

## 2022-09-06 ENCOUNTER — LAB (OUTPATIENT)
Dept: LAB | Facility: HOSPITAL | Age: 67
End: 2022-09-06

## 2022-09-06 VITALS
TEMPERATURE: 98.4 F | DIASTOLIC BLOOD PRESSURE: 85 MMHG | HEART RATE: 105 BPM | BODY MASS INDEX: 43.68 KG/M2 | HEIGHT: 71 IN | WEIGHT: 312 LBS | SYSTOLIC BLOOD PRESSURE: 138 MMHG

## 2022-09-06 DIAGNOSIS — M79.609 PAIN IN EXTREMITY, UNSPECIFIED EXTREMITY: ICD-10-CM

## 2022-09-06 DIAGNOSIS — E78.2 MIXED HYPERLIPIDEMIA: ICD-10-CM

## 2022-09-06 DIAGNOSIS — I10 ESSENTIAL HYPERTENSION: ICD-10-CM

## 2022-09-06 DIAGNOSIS — R73.03 PREDIABETES: ICD-10-CM

## 2022-09-06 DIAGNOSIS — K29.70 GASTRITIS DETERMINED BY ENDOSCOPY: ICD-10-CM

## 2022-09-06 DIAGNOSIS — I10 ESSENTIAL HYPERTENSION: Primary | ICD-10-CM

## 2022-09-06 PROBLEM — H61.22 IMPACTED CERUMEN OF LEFT EAR: Status: RESOLVED | Noted: 2022-03-02 | Resolved: 2022-09-06

## 2022-09-06 PROBLEM — Z00.00 WELL ADULT EXAM: Status: RESOLVED | Noted: 2022-03-02 | Resolved: 2022-09-06

## 2022-09-06 PROBLEM — Z12.5 PROSTATE CANCER SCREENING: Status: RESOLVED | Noted: 2022-03-02 | Resolved: 2022-09-06

## 2022-09-06 LAB
BASOPHILS # BLD AUTO: 0.05 10*3/MM3 (ref 0–0.2)
BASOPHILS NFR BLD AUTO: 0.5 % (ref 0–1.5)
DEPRECATED RDW RBC AUTO: 49.1 FL (ref 37–54)
EOSINOPHIL # BLD AUTO: 0.09 10*3/MM3 (ref 0–0.4)
EOSINOPHIL NFR BLD AUTO: 0.8 % (ref 0.3–6.2)
ERYTHROCYTE [DISTWIDTH] IN BLOOD BY AUTOMATED COUNT: 13.6 % (ref 12.3–15.4)
ERYTHROCYTE [SEDIMENTATION RATE] IN BLOOD: 45 MM/HR (ref 0–20)
HBA1C MFR BLD: 5.9 % (ref 4.8–5.6)
HCT VFR BLD AUTO: 48.4 % (ref 37.5–51)
HGB BLD-MCNC: 15.4 G/DL (ref 13–17.7)
IMM GRANULOCYTES # BLD AUTO: 0.04 10*3/MM3 (ref 0–0.05)
IMM GRANULOCYTES NFR BLD AUTO: 0.4 % (ref 0–0.5)
LYMPHOCYTES # BLD AUTO: 2.04 10*3/MM3 (ref 0.7–3.1)
LYMPHOCYTES NFR BLD AUTO: 18.5 % (ref 19.6–45.3)
MCH RBC QN AUTO: 30.7 PG (ref 26.6–33)
MCHC RBC AUTO-ENTMCNC: 31.8 G/DL (ref 31.5–35.7)
MCV RBC AUTO: 96.6 FL (ref 79–97)
MONOCYTES # BLD AUTO: 1.07 10*3/MM3 (ref 0.1–0.9)
MONOCYTES NFR BLD AUTO: 9.7 % (ref 5–12)
NEUTROPHILS NFR BLD AUTO: 7.76 10*3/MM3 (ref 1.7–7)
NEUTROPHILS NFR BLD AUTO: 70.1 % (ref 42.7–76)
PLATELET # BLD AUTO: 201 10*3/MM3 (ref 140–450)
PMV BLD AUTO: 9.2 FL (ref 6–12)
RBC # BLD AUTO: 5.01 10*6/MM3 (ref 4.14–5.8)
WBC NRBC COR # BLD: 11.05 10*3/MM3 (ref 3.4–10.8)

## 2022-09-06 PROCEDURE — 85025 COMPLETE CBC W/AUTO DIFF WBC: CPT

## 2022-09-06 PROCEDURE — 80061 LIPID PANEL: CPT

## 2022-09-06 PROCEDURE — 80053 COMPREHEN METABOLIC PANEL: CPT

## 2022-09-06 PROCEDURE — 36415 COLL VENOUS BLD VENIPUNCTURE: CPT

## 2022-09-06 PROCEDURE — 82550 ASSAY OF CK (CPK): CPT

## 2022-09-06 PROCEDURE — 83735 ASSAY OF MAGNESIUM: CPT

## 2022-09-06 PROCEDURE — 83036 HEMOGLOBIN GLYCOSYLATED A1C: CPT

## 2022-09-06 PROCEDURE — 85652 RBC SED RATE AUTOMATED: CPT

## 2022-09-06 PROCEDURE — 99214 OFFICE O/P EST MOD 30 MIN: CPT | Performed by: NURSE PRACTITIONER

## 2022-09-06 RX ORDER — LISINOPRIL 10 MG/1
10 TABLET ORAL DAILY
Qty: 90 TABLET | Refills: 1 | Status: SHIPPED | OUTPATIENT
Start: 2022-09-06

## 2022-09-06 NOTE — ASSESSMENT & PLAN NOTE
Monitor blood pressure at home and report any abnormal readings.  Follow-up in 6 months or sooner if concerns.

## 2022-09-06 NOTE — PROGRESS NOTES
"Chief Complaint  Hypertension (6 month follow up )    Subjective  Patient is a 66-year-old male who is here today for follow-up on essential hypertension.  He is taking lisinopril 10 mg once daily.  Denies side effects and requests refills.  States blood pressures under good control.    Gastritis stable with use of omeprazole 40 mg daily.  Denies side effects and requests refills.  Last endoscopy done in 2018 and follow-up scope was not advised for 10 years.    he has intermittent bony pain of the extremities, mostly in his arms.  He denies any injury and reports history of neck and back pain being chronic.  But stable.   pain is very brief lasting only seconds.  He denies any numbness or tingling.  He has previously tried treatments and chiropractor, Dr. Whitt.  Muscle relaxers, anti-inflammatories, application of heat and cold.        Yemi Pabon presents to [facility]          Objective   Vital Signs:   Vitals:    22 1249   BP: 138/85   BP Location: Left arm   Patient Position: Sitting   Pulse: 105   Temp: 98.4 °F (36.9 °C)   TempSrc: Oral   Weight: (!) 142 kg (312 lb)   Height: 179.7 cm (70.75\")      Body mass index is 43.82 kg/m².  Physical Exam  Vitals reviewed.   Constitutional:       General: He is not in acute distress.     Appearance: Normal appearance. He is well-developed. He is obese.   Cardiovascular:      Rate and Rhythm: Normal rate and regular rhythm.      Pulses:           Posterior tibial pulses are 2+ on the right side and 2+ on the left side.      Heart sounds: Normal heart sounds.   Pulmonary:      Effort: Pulmonary effort is normal.      Breath sounds: Normal breath sounds.   Musculoskeletal:      Right lower leg: No edema.      Left lower le+ Edema present.   Skin:     General: Skin is warm and dry.   Neurological:      General: No focal deficit present.      Mental Status: He is alert.   Psychiatric:         Attention and Perception: Attention " normal.         Mood and Affect: Mood and affect normal.         Behavior: Behavior normal.          Result Review :     CMP    CMP 3/2/22   Glucose 104 (A)   BUN 14   Creatinine 1.05   Sodium 140   Potassium 4.0   Chloride 106   Calcium 9.3   Albumin 3.70   Total Bilirubin 0.3   Alkaline Phosphatase 65   AST (SGOT) 20   ALT (SGPT) 35   (A) Abnormal value                  Lipid Panel    Lipid Panel 3/2/22   Total Cholesterol 150   Triglycerides 102   HDL Cholesterol 45   VLDL Cholesterol 19   LDL Cholesterol  86   LDL/HDL Ratio 1.88               Most Recent A1C    HGBA1C Most Recent 3/2/22   Hemoglobin A1C 5.90 (A)   (A) Abnormal value                     Assessment and Plan    Diagnoses and all orders for this visit:    1. Essential hypertension (Primary)  Assessment & Plan:  Monitor blood pressure at home and report any abnormal readings.  Follow-up in 6 months or sooner if concerns.    Orders:  -     lisinopril (PRINIVIL,ZESTRIL) 10 MG tablet; Take 1 tablet by mouth Daily.  Dispense: 90 tablet; Refill: 1  -     Comprehensive metabolic panel; Future    2. Gastritis determined by endoscopy  Assessment & Plan:  Symptoms stable.  States she does not need refills of omeprazole at this time.      3. Prediabetes  -     Hemoglobin A1c; Future    4. Mixed hyperlipidemia  Assessment & Plan:  No refills are needed of atorvastatin at this time.  Further treatment pending lab results.    Orders:  -     Lipid panel; Future    5. Pain in extremity, unspecified extremity  Assessment & Plan:  Further treatment pending lab results.  If labs are normal, consider updated imaging of the cervical and lumbar spine.    Orders:  -     CK; Future  -     Magnesium; Future  -     Sedimentation rate, automated; Future  -     CBC w AUTO Differential; Future      Follow Up    No follow-ups on file.  Patient was given instructions and counseling regarding his condition or for health maintenance advice. Please see specific information pulled  into the AVS if appropriate.

## 2022-09-06 NOTE — ASSESSMENT & PLAN NOTE
Further treatment pending lab results.  If labs are normal, consider updated imaging of the cervical and lumbar spine.

## 2022-09-07 LAB
ALBUMIN SERPL-MCNC: 3.7 G/DL (ref 3.5–5.2)
ALBUMIN/GLOB SERPL: 1 G/DL
ALP SERPL-CCNC: 65 U/L (ref 39–117)
ALT SERPL W P-5'-P-CCNC: 33 U/L (ref 1–41)
ANION GAP SERPL CALCULATED.3IONS-SCNC: 11.6 MMOL/L (ref 5–15)
AST SERPL-CCNC: 23 U/L (ref 1–40)
BILIRUB SERPL-MCNC: 0.4 MG/DL (ref 0–1.2)
BUN SERPL-MCNC: 13 MG/DL (ref 8–23)
BUN/CREAT SERPL: 12.6 (ref 7–25)
CALCIUM SPEC-SCNC: 9 MG/DL (ref 8.6–10.5)
CHLORIDE SERPL-SCNC: 104 MMOL/L (ref 98–107)
CHOLEST SERPL-MCNC: 138 MG/DL (ref 0–200)
CK SERPL-CCNC: 54 U/L (ref 20–200)
CO2 SERPL-SCNC: 22.4 MMOL/L (ref 22–29)
CREAT SERPL-MCNC: 1.03 MG/DL (ref 0.76–1.27)
EGFRCR SERPLBLD CKD-EPI 2021: 80.1 ML/MIN/1.73
GLOBULIN UR ELPH-MCNC: 3.6 GM/DL
GLUCOSE SERPL-MCNC: 123 MG/DL (ref 65–99)
HDLC SERPL-MCNC: 42 MG/DL (ref 40–60)
LDLC SERPL CALC-MCNC: 76 MG/DL (ref 0–100)
LDLC/HDLC SERPL: 1.77 {RATIO}
MAGNESIUM SERPL-MCNC: 2.1 MG/DL (ref 1.6–2.4)
POTASSIUM SERPL-SCNC: 4.2 MMOL/L (ref 3.5–5.2)
PROT SERPL-MCNC: 7.3 G/DL (ref 6–8.5)
SODIUM SERPL-SCNC: 138 MMOL/L (ref 136–145)
TRIGL SERPL-MCNC: 109 MG/DL (ref 0–150)
VLDLC SERPL-MCNC: 20 MG/DL (ref 5–40)

## 2022-09-12 DIAGNOSIS — M25.50 ARTHRALGIA, UNSPECIFIED JOINT: ICD-10-CM

## 2022-09-12 DIAGNOSIS — R70.0 ELEVATED SED RATE: Primary | ICD-10-CM

## 2022-09-12 NOTE — PROGRESS NOTES
Blood sugar stable at a prediabetic range.  Decrease intake of sugar to avoid converting to diabetes in the future.  You do not have any muscle breakdown and magnesium level was normal.  Kidney and liver function are normal.  Lipid panel looks very good.  You are not anemic.  Sed rate tells me that there is some inflammation present and consistent with findings 1 year ago.  This test does not tell us exactly where the inflammation is coming from.  I recommend repeating this lab test with some other test to further investigate the cause.  I am entering orders in her chart for further lab work to be done in 1 month.

## 2022-10-19 ENCOUNTER — TELEPHONE (OUTPATIENT)
Dept: FAMILY MEDICINE CLINIC | Age: 67
End: 2022-10-19

## 2022-10-28 ENCOUNTER — LAB (OUTPATIENT)
Dept: LAB | Facility: HOSPITAL | Age: 67
End: 2022-10-28

## 2022-10-28 DIAGNOSIS — M25.50 ARTHRALGIA, UNSPECIFIED JOINT: ICD-10-CM

## 2022-10-28 DIAGNOSIS — R70.0 ELEVATED SED RATE: ICD-10-CM

## 2022-10-28 LAB
ASO AB SERPL-ACNC: NEGATIVE [IU]/ML
CHROMATIN AB SERPL-ACNC: <10 IU/ML (ref 0–14)
CRP SERPL-MCNC: 1.11 MG/DL (ref 0–0.5)
DSDNA IGG SERPL IA-ACNC: NEGATIVE [IU]/ML
ERYTHROCYTE [SEDIMENTATION RATE] IN BLOOD: 42 MM/HR (ref 0–20)
NUCLEAR IGG SER IA-RTO: NEGATIVE
URATE SERPL-MCNC: 5.4 MG/DL (ref 3.4–7)

## 2022-10-28 PROCEDURE — 86063 ANTISTREPTOLYSIN O SCREEN: CPT

## 2022-10-28 PROCEDURE — 86225 DNA ANTIBODY NATIVE: CPT

## 2022-10-28 PROCEDURE — 85652 RBC SED RATE AUTOMATED: CPT

## 2022-10-28 PROCEDURE — 84550 ASSAY OF BLOOD/URIC ACID: CPT

## 2022-10-28 PROCEDURE — 36415 COLL VENOUS BLD VENIPUNCTURE: CPT

## 2022-10-28 PROCEDURE — 86140 C-REACTIVE PROTEIN: CPT

## 2022-10-28 PROCEDURE — 86038 ANTINUCLEAR ANTIBODIES: CPT

## 2022-10-28 PROCEDURE — 86431 RHEUMATOID FACTOR QUANT: CPT

## 2022-11-27 DIAGNOSIS — K29.70 GASTRITIS DETERMINED BY ENDOSCOPY: ICD-10-CM

## 2022-11-28 RX ORDER — OMEPRAZOLE 40 MG/1
CAPSULE, DELAYED RELEASE ORAL
Qty: 90 CAPSULE | Refills: 1 | Status: SHIPPED | OUTPATIENT
Start: 2022-11-28 | End: 2023-03-07 | Stop reason: SDUPTHER

## 2023-01-01 ENCOUNTER — TELEPHONE (OUTPATIENT)
Dept: FAMILY MEDICINE CLINIC | Age: 68
End: 2023-01-01

## 2023-03-07 ENCOUNTER — LAB (OUTPATIENT)
Dept: LAB | Facility: HOSPITAL | Age: 68
End: 2023-03-07
Payer: MEDICARE

## 2023-03-07 ENCOUNTER — OFFICE VISIT (OUTPATIENT)
Dept: FAMILY MEDICINE CLINIC | Age: 68
End: 2023-03-07
Payer: MEDICARE

## 2023-03-07 VITALS
OXYGEN SATURATION: 96 % | DIASTOLIC BLOOD PRESSURE: 70 MMHG | HEART RATE: 94 BPM | TEMPERATURE: 98.6 F | SYSTOLIC BLOOD PRESSURE: 140 MMHG | BODY MASS INDEX: 44.1 KG/M2 | HEIGHT: 71 IN | WEIGHT: 315 LBS

## 2023-03-07 DIAGNOSIS — R79.82 ELEVATED C-REACTIVE PROTEIN (CRP): ICD-10-CM

## 2023-03-07 DIAGNOSIS — E78.2 MIXED HYPERLIPIDEMIA: ICD-10-CM

## 2023-03-07 DIAGNOSIS — R70.0 ELEVATED SED RATE: ICD-10-CM

## 2023-03-07 DIAGNOSIS — I10 ESSENTIAL HYPERTENSION: Primary | ICD-10-CM

## 2023-03-07 DIAGNOSIS — Z00.00 MEDICARE ANNUAL WELLNESS VISIT, SUBSEQUENT: ICD-10-CM

## 2023-03-07 DIAGNOSIS — R73.03 PREDIABETES: ICD-10-CM

## 2023-03-07 DIAGNOSIS — I10 ESSENTIAL HYPERTENSION: ICD-10-CM

## 2023-03-07 DIAGNOSIS — K29.70 GASTRITIS DETERMINED BY ENDOSCOPY: ICD-10-CM

## 2023-03-07 PROBLEM — M25.569 KNEE PAIN: Status: ACTIVE | Noted: 2020-06-29

## 2023-03-07 LAB
ALBUMIN SERPL-MCNC: 3.5 G/DL (ref 3.5–5.2)
ALBUMIN/GLOB SERPL: 0.9 G/DL
ALP SERPL-CCNC: 62 U/L (ref 39–117)
ALT SERPL W P-5'-P-CCNC: 30 U/L (ref 1–41)
ANION GAP SERPL CALCULATED.3IONS-SCNC: 13.6 MMOL/L (ref 5–15)
AST SERPL-CCNC: 26 U/L (ref 1–40)
BILIRUB SERPL-MCNC: 0.4 MG/DL (ref 0–1.2)
BUN SERPL-MCNC: 14 MG/DL (ref 8–23)
BUN/CREAT SERPL: 13.1 (ref 7–25)
CALCIUM SPEC-SCNC: 9.3 MG/DL (ref 8.6–10.5)
CHLORIDE SERPL-SCNC: 102 MMOL/L (ref 98–107)
CHOLEST SERPL-MCNC: 137 MG/DL (ref 0–200)
CO2 SERPL-SCNC: 21.4 MMOL/L (ref 22–29)
CREAT SERPL-MCNC: 1.07 MG/DL (ref 0.76–1.27)
CRP SERPL-MCNC: 1 MG/DL (ref 0–0.5)
EGFRCR SERPLBLD CKD-EPI 2021: 76.1 ML/MIN/1.73
ERYTHROCYTE [SEDIMENTATION RATE] IN BLOOD: 58 MM/HR (ref 0–20)
GLOBULIN UR ELPH-MCNC: 4 GM/DL
GLUCOSE SERPL-MCNC: 89 MG/DL (ref 65–99)
HBA1C MFR BLD: 5.9 % (ref 4.8–5.6)
HDLC SERPL-MCNC: 41 MG/DL (ref 40–60)
LDLC SERPL CALC-MCNC: 74 MG/DL (ref 0–100)
LDLC/HDLC SERPL: 1.76 {RATIO}
POTASSIUM SERPL-SCNC: 4.3 MMOL/L (ref 3.5–5.2)
PROT SERPL-MCNC: 7.5 G/DL (ref 6–8.5)
SODIUM SERPL-SCNC: 137 MMOL/L (ref 136–145)
TRIGL SERPL-MCNC: 119 MG/DL (ref 0–150)
VLDLC SERPL-MCNC: 22 MG/DL (ref 5–40)

## 2023-03-07 PROCEDURE — 80053 COMPREHEN METABOLIC PANEL: CPT

## 2023-03-07 PROCEDURE — 36415 COLL VENOUS BLD VENIPUNCTURE: CPT

## 2023-03-07 PROCEDURE — 85652 RBC SED RATE AUTOMATED: CPT

## 2023-03-07 PROCEDURE — G0439 PPPS, SUBSEQ VISIT: HCPCS | Performed by: NURSE PRACTITIONER

## 2023-03-07 PROCEDURE — 1170F FXNL STATUS ASSESSED: CPT | Performed by: NURSE PRACTITIONER

## 2023-03-07 PROCEDURE — 1160F RVW MEDS BY RX/DR IN RCRD: CPT | Performed by: NURSE PRACTITIONER

## 2023-03-07 PROCEDURE — 83036 HEMOGLOBIN GLYCOSYLATED A1C: CPT

## 2023-03-07 PROCEDURE — 80061 LIPID PANEL: CPT

## 2023-03-07 PROCEDURE — 86140 C-REACTIVE PROTEIN: CPT

## 2023-03-07 RX ORDER — OMEPRAZOLE 40 MG/1
40 CAPSULE, DELAYED RELEASE ORAL DAILY
Qty: 90 CAPSULE | Refills: 1 | Status: SHIPPED | OUTPATIENT
Start: 2023-03-07

## 2023-03-07 NOTE — PROGRESS NOTES
Subsequent Medicare Wellness Visit  The ABC's of Medicare Wellness Visit  Chief Complaint   Patient presents with   • Medicare Wellness-subsequent       Subjective   History of Present Illness      Fabian is a 67 y.o. male who presents   for a Subsequent Medicare Wellness Visit.    Erosive gastritis noted per endoscopy 2018.  Colonoscopy 2018 was normal.  Next scopes to be done in 2028.  States he was ill after getting a flu vaccine in his 20s.  Defers flu, shingles, and pneumonia vaccination.  Defers hepatitis C screening.  Wears reading glasses.  Has not had a recent eye or dental exam.  He denies dental concerns.  Has known osteoarthritis and still persists with some intermittent arthralgia diffuse.  Rheumatoid work-up was negative.  CRP and ESR remain elevated.  He has seen a chiropractor for his back in the past.  Defers any further spinal imaging or referral to rheumatology for further evaluation of pain at this time.    He will see cardiologist Dr. Abdirahman alvarado.  He takes lisinopril 10 mg daily for hypertension, atorvastatin 10 mg at bedtime for hyperlipidemia.  He feels that blood pressures under good control.  Denies medication side effects.  No refills are needed at this time.    For history of erosive gastritis he continues to take omeprazole 40 mg capsule once daily.  He denies side effects and requests refills.  Does the patient have evidence of cognitive impairment?   No    Asprin use counseling:Taking ASA appropriately as indicated    Recent Hospitalizations:  No hospitalization(s) within the last year.    Advanced Care Planning:  ACP discussion was declined by the patient. Patient does not have an advance directive, declines further assistance.    The following portions of the patient's history were reviewed   and updated as appropriate: allergies, current medications, past family history, past medical history, past social history, past surgical history and problem list.    Compared to one year ago,  the patient feels his   physical health is the same.  Compared to one year ago, the patient feels his   mental health is the same.    Reviewed chart for potential of high risk medication in the elderly:  yes  Reviewed chart for potential of harmful drug interactions in the elderly:  yes    Class 3 Severe Obesity (BMI >=40). Obesity-related health conditions include the following: obstructive sleep apnea, hypertension, coronary heart disease, diabetes mellitus, dyslipidemias, GERD, peripheral vascular disease, osteoarthritis and lower extremity venous stasis disease. Obesity is unchanged. BMI is is above average; BMI management plan is completed. We discussed portion control and increasing exercise.       HEALTH RISK ASSESSMENT BEGIN  Fall Risk Screen:  STEADI Fall Risk Assessment was completed, and patient is at LOW risk for falls.Assessment completed on:3/7/2023    Depression Screen:   PHQ-2/PHQ-9 Depression Screening 3/7/2023   Retired PHQ-9 Total Score -   Retired Total Score -   Little Interest or Pleasure in Doing Things 0-->not at all   Feeling Down, Depressed or Hopeless 0-->not at all   PHQ-9: Brief Depression Severity Measure Score 0       Current Medical Providers:  Patient Care Team:  Jyoti Mcduffie APRN as PCP - General (Nurse Practitioner)  Brittanie Gary MD as Consulting Physician (Cardiology)    Smoking Status:  Social History     Tobacco Use   Smoking Status Never   Smokeless Tobacco Never       Alcohol Consumption:  Social History     Substance and Sexual Activity   Alcohol Use Not Currently       Health Habits and Functional and Cognitive Screening:  Functional & Cognitive Status 3/7/2023   Do you have difficulty preparing food and eating? No   Do you have difficulty bathing yourself, getting dressed or grooming yourself? No   Do you have difficulty using the toilet? No   Do you have difficulty moving around from place to place? Yes   Do you have trouble with steps or getting out of a bed  or a chair? Yes   Current Diet Other   Dental Exam Not up to date   Eye Exam Not up to date   Exercise (times per week) 2 times per week   Current Exercises Include Walking   Do you need help using the phone?  No   Are you deaf or do you have serious difficulty hearing?  No   Do you need help with transportation? Yes   Do you need help shopping? No   Do you need help preparing meals?  No   Do you need help with housework?  No   Do you need help with laundry? No   Do you need help taking your medications? No   Do you need help managing money? No   Do you ever drive or ride in a car without wearing a seat belt? No   Have you felt unusual stress, anger or loneliness in the last month? No   Who do you live with? Spouse   If you need help, do you have trouble finding someone available to you? No   Have you been bothered in the last four weeks by sexual problems? No   Do you have difficulty concentrating, remembering or making decisions? No       Age-appropriate Screening Schedule:  Refer to the list below for future screening recommendations based on patient's age, sex and/or medical conditions. Orders for these recommended tests are listed in the plan section. The patient has been provided with a written plan.    Health Maintenance   Topic Date Due   • ZOSTER VACCINE (1 of 2) Never done   • Pneumococcal Vaccine 65+ (1 - PCV) Never done   • HEPATITIS C SCREENING  Never done   • INFLUENZA VACCINE  03/31/2023 (Originally 8/1/2022)   • LIPID PANEL  09/06/2023   • ANNUAL WELLNESS VISIT  03/07/2024   • COLORECTAL CANCER SCREENING  03/02/2028   • TDAP/TD VACCINES (2 - Td or Tdap) 09/24/2028   • COVID-19 Vaccine  Completed     HEALTH RISK ASSESSMENT END    Outpatient Medications Prior to Visit   Medication Sig Dispense Refill   • aspirin 81 MG EC tablet aspirin 81 mg oral tablet,delayed release (DR/EC) take 1 tablet (81 mg) by oral route once daily   Active     • atorvastatin (LIPITOR) 10 MG tablet Take 1 tablet by mouth Daily.  "90 tablet 1   • B Complex Vitamins (B COMPLEX PO) Take  by mouth Daily.     • Coenzyme Q10 (CO Q 10 PO) Take 200 mg by mouth Daily.     • fexofenadine (ALLEGRA) 180 MG tablet TAKE ONE TABLET BY MOUTH DAILY 90 tablet 0   • lisinopril (PRINIVIL,ZESTRIL) 10 MG tablet Take 1 tablet by mouth Daily. 90 tablet 1   • vitamin D3 125 MCG (5000 UT) capsule capsule Take  by mouth Daily.     • Zinc 50 MG tablet Take  by mouth Daily.     • omeprazole (priLOSEC) 40 MG capsule TAKE ONE CAPSULE BY MOUTH DAILY 90 capsule 1   • Probiotic Product (PROBIOTIC PO) Take  by mouth Daily.       No facility-administered medications prior to visit.       Patient Active Problem List   Diagnosis   • History of pulmonary embolism   • Gastritis determined by endoscopy   • Mixed hyperlipidemia   • Prediabetes   • Essential hypertension   • Arthralgia   • Edema   • Pain in extremity   • Knee pain   • Elevated C-reactive protein (CRP)   • Elevated sed rate   • Medicare annual wellness visit, subsequent            Objective      Vitals:    23 1308 23 1404   BP: 150/51 140/70   BP Location: Left arm Left arm   Patient Position: Sitting Sitting   Cuff Size: Large Adult Large Adult   Pulse: 94    Temp: 98.6 °F (37 °C)    TempSrc: Oral    SpO2: 96%    Weight: (!) 148 kg (327 lb)    Height: 179.7 cm (70.75\")        Physical Exam  Vitals reviewed.   Constitutional:       General: He is not in acute distress.     Appearance: Normal appearance. He is well-developed. He is obese.   Neck:      Thyroid: No thyromegaly or thyroid tenderness.      Vascular: No carotid bruit.   Cardiovascular:      Rate and Rhythm: Normal rate and regular rhythm.      Heart sounds: Normal heart sounds.   Pulmonary:      Effort: Pulmonary effort is normal.      Breath sounds: Normal breath sounds.   Musculoskeletal:      Right lower le+ Edema present.      Left lower le+ Edema present.   Skin:     General: Skin is warm and dry.   Neurological:      General: No " focal deficit present.      Mental Status: He is alert.   Psychiatric:         Attention and Perception: Attention normal.         Mood and Affect: Mood and affect normal.         Behavior: Behavior normal.             Result Review :           Lab Results - Last 18 Months   Lab Units 10/28/22  0925 09/06/22  1352 03/02/22  1415   BUN mg/dL  --  13 14   CREATININE mg/dL  --  1.03 1.05   SODIUM mmol/L  --  138 140   POTASSIUM mmol/L  --  4.2 4.0   CHLORIDE mmol/L  --  104 106   CALCIUM mg/dL  --  9.0 9.3   ALBUMIN g/dL  --  3.70 3.70   BILIRUBIN mg/dL  --  0.4 0.3   ALK PHOS U/L  --  65 65   AST (SGOT) U/L  --  23 20   ALT (SGPT) U/L  --  33 35   TRIGLYCERIDES mg/dL  --  109 102   HDL CHOL mg/dL  --  42 45   VLDL CHOL mg/dL  --  20 19   LDL CHOL mg/dL  --  76 86   LDL/HDL RATIO   --  1.77 1.88   CK TOTAL U/L  --  54  --    HEMOGLOBIN A1C %  --  5.90* 5.90*   WBC 10*3/mm3  --  11.05*  --    RBC 10*6/mm3  --  5.01  --    HEMATOCRIT %  --  48.4  --    MCV fL  --  96.6  --    MCH pg  --  30.7  --    PSA ng/mL  --   --  0.210   URIC ACID mg/dL 5.4  --   --        The following data was reviewed by: DOMINIQUE Smith on 03/07/2023:    Assessment & Plan   Assessment and Plan      Diagnoses and all orders for this visit:    1. Essential hypertension (Primary)  -     Comprehensive metabolic panel; Future  -     Lipid panel; Future    2. Gastritis determined by endoscopy  -     omeprazole (priLOSEC) 40 MG capsule; Take 1 capsule by mouth Daily.  Dispense: 90 capsule; Refill: 1    3. Mixed hyperlipidemia  -     Comprehensive metabolic panel; Future  -     Lipid panel; Future    4. Prediabetes  -     Hemoglobin A1c; Future    5. Elevated C-reactive protein (CRP)  -     C-reactive protein; Future    6. Elevated sed rate  -     Sedimentation rate, automated; Future    7. Medicare annual wellness visit, subsequent  Assessment & Plan:  Preventive care measures are discussed.  Further treatment pending lab  results        Medicare Risks and Personalized Health Plan  CMS Preventative Services Quick Reference  Advance Directive Discussion  Colon Cancer Screening  Immunizations Discussed/Encouraged (specific immunizations; Influenza, Prevnar 20 (Pneumococcal 20-valent conjugate) and Shingrix )    The above risks/problems have been discussed with the patient.  Pertinent information has been shared with the patient in the   After Visit Summary. Follow up plans and orders are seen below   in the Assessment/Plan Section.    I spent 50 minutes caring for Yemi on this date of service. This time includes time spent by me in the following activities:preparing for the visit, reviewing tests, obtaining and/or reviewing a separately obtained history, performing a medically appropriate examination and/or evaluation , ordering medications, tests, or procedures and documenting information in the medical record    Follow Up   No follow-ups on file.     An After Visit Summary and PPPS were given to the patient.

## 2023-03-15 NOTE — PROGRESS NOTES
Please call patient.  Blood test for inflammation are still elevated.  I strongly recommend that he get evaluated by rheumatologist due to him being symptomatic with pain.  Hemoglobin A1c is stable in the prediabetic range.  Minimize intake of sugar.  Lipid panel is normal.  Blood sugar, kidney, and liver function are normal.

## 2023-03-16 DIAGNOSIS — R70.0 ELEVATED SED RATE: Primary | ICD-10-CM

## 2023-03-16 DIAGNOSIS — R79.82 ELEVATED C-REACTIVE PROTEIN (CRP): ICD-10-CM

## 2023-03-16 DIAGNOSIS — M25.50 ARTHRALGIA, UNSPECIFIED JOINT: ICD-10-CM

## 2023-03-17 NOTE — TELEPHONE ENCOUNTER
Caller: Marietta Pabon    Relationship: Emergency Contact    Best call back number: [phone]    Who are you requesting to speak with (clinical staff, provider,  specific staff member): CLINICAL    What was the call regarding: PATIENTS WIFE STATES THEY NEED TO WAIT A LITTLE WHILE BEFORE HE SEES THE SPECIALIST FOR INFLAMMATION.    Do you require a callback: IF NECESSARY

## 2023-03-27 RX ORDER — FEXOFENADINE HCL 180 MG/1
TABLET ORAL
Qty: 90 TABLET | Refills: 0 | Status: SHIPPED | OUTPATIENT
Start: 2023-03-27

## 2023-04-27 DIAGNOSIS — I10 ESSENTIAL HYPERTENSION: ICD-10-CM

## 2023-04-27 RX ORDER — LISINOPRIL 10 MG/1
TABLET ORAL
Qty: 90 TABLET | Refills: 1 | Status: SHIPPED | OUTPATIENT
Start: 2023-04-27

## 2023-04-28 DIAGNOSIS — E78.2 MIXED HYPERLIPIDEMIA: ICD-10-CM

## 2023-05-01 RX ORDER — ATORVASTATIN CALCIUM 10 MG/1
TABLET, FILM COATED ORAL
Qty: 90 TABLET | Refills: 1 | Status: SHIPPED | OUTPATIENT
Start: 2023-05-01

## 2023-05-15 RX ORDER — FEXOFENADINE HCL 180 MG/1
TABLET ORAL
Qty: 90 TABLET | Refills: 0 | OUTPATIENT
Start: 2023-05-15

## 2023-08-08 ENCOUNTER — OFFICE VISIT (OUTPATIENT)
Dept: FAMILY MEDICINE CLINIC | Age: 68
End: 2023-08-08
Payer: MEDICARE

## 2023-08-08 ENCOUNTER — LAB (OUTPATIENT)
Dept: LAB | Facility: HOSPITAL | Age: 68
End: 2023-08-08
Payer: MEDICARE

## 2023-08-08 VITALS
WEIGHT: 315 LBS | BODY MASS INDEX: 44.1 KG/M2 | DIASTOLIC BLOOD PRESSURE: 74 MMHG | SYSTOLIC BLOOD PRESSURE: 136 MMHG | HEIGHT: 71 IN | OXYGEN SATURATION: 95 % | HEART RATE: 93 BPM

## 2023-08-08 DIAGNOSIS — M25.50 ARTHRALGIA, UNSPECIFIED JOINT: ICD-10-CM

## 2023-08-08 DIAGNOSIS — R07.82 INTERCOSTAL PAIN: ICD-10-CM

## 2023-08-08 DIAGNOSIS — Z12.5 PROSTATE CANCER SCREENING: ICD-10-CM

## 2023-08-08 DIAGNOSIS — I10 ESSENTIAL HYPERTENSION: ICD-10-CM

## 2023-08-08 DIAGNOSIS — R73.03 PREDIABETES: ICD-10-CM

## 2023-08-08 DIAGNOSIS — E78.2 MIXED HYPERLIPIDEMIA: ICD-10-CM

## 2023-08-08 DIAGNOSIS — J30.9 ALLERGIC RHINITIS, UNSPECIFIED SEASONALITY, UNSPECIFIED TRIGGER: ICD-10-CM

## 2023-08-08 DIAGNOSIS — K29.70 GASTRITIS DETERMINED BY ENDOSCOPY: Primary | ICD-10-CM

## 2023-08-08 PROBLEM — M79.609 PAIN IN EXTREMITY: Status: RESOLVED | Noted: 2022-09-06 | Resolved: 2023-08-08

## 2023-08-08 LAB — HBA1C MFR BLD: 5.8 % (ref 4.8–5.6)

## 2023-08-08 PROCEDURE — 36415 COLL VENOUS BLD VENIPUNCTURE: CPT

## 2023-08-08 PROCEDURE — 1160F RVW MEDS BY RX/DR IN RCRD: CPT | Performed by: NURSE PRACTITIONER

## 2023-08-08 PROCEDURE — 80053 COMPREHEN METABOLIC PANEL: CPT

## 2023-08-08 PROCEDURE — 3078F DIAST BP <80 MM HG: CPT | Performed by: NURSE PRACTITIONER

## 2023-08-08 PROCEDURE — 80061 LIPID PANEL: CPT

## 2023-08-08 PROCEDURE — 83036 HEMOGLOBIN GLYCOSYLATED A1C: CPT

## 2023-08-08 PROCEDURE — G0103 PSA SCREENING: HCPCS

## 2023-08-08 PROCEDURE — 99214 OFFICE O/P EST MOD 30 MIN: CPT | Performed by: NURSE PRACTITIONER

## 2023-08-08 PROCEDURE — 3075F SYST BP GE 130 - 139MM HG: CPT | Performed by: NURSE PRACTITIONER

## 2023-08-08 PROCEDURE — 1159F MED LIST DOCD IN RCRD: CPT | Performed by: NURSE PRACTITIONER

## 2023-08-08 RX ORDER — FEXOFENADINE HCL 180 MG/1
180 TABLET ORAL DAILY
Qty: 90 TABLET | Refills: 1 | Status: SHIPPED | OUTPATIENT
Start: 2023-08-08

## 2023-08-08 RX ORDER — OMEPRAZOLE 40 MG/1
40 CAPSULE, DELAYED RELEASE ORAL DAILY
Qty: 90 CAPSULE | Refills: 1 | Status: SHIPPED | OUTPATIENT
Start: 2023-08-08

## 2023-08-08 RX ORDER — LISINOPRIL 10 MG/1
10 TABLET ORAL DAILY
Qty: 90 TABLET | Refills: 1 | Status: SHIPPED | OUTPATIENT
Start: 2023-08-08

## 2023-08-08 RX ORDER — ATORVASTATIN CALCIUM 10 MG/1
10 TABLET, FILM COATED ORAL DAILY
Qty: 90 TABLET | Refills: 1 | Status: SHIPPED | OUTPATIENT
Start: 2023-08-08

## 2023-08-08 NOTE — PROGRESS NOTES
"Chief Complaint  Hyperlipidemia (6 month follow up ), Hypertension, History of pulmonary embolism, and Prediabetes    Subjective          Yemi Pabon presents to [facility]     Patient is a 67-year-old male with here today with his wife, Marietta.  Reports blood pressures under good control on lisinopril 10 mg daily.  Denies side effects and requests refills.    Currently undergoing workup by rheumatology for elevated sed rate and C-reactive protein and arthralgia.  Follow-up as scheduled tomorrow.  He has had a variety of x-rays and labs done to further assist his joint pain.    History of GERD and gastritis with symptoms stable on omeprazole 40 mg daily.  Denies side effects and requests refills.    For seasonal allergies he takes fexofenadine 180 mg daily.  Prefers to get it prescribed however insurance is not covering any purchases fexofenadine out-of-pocket.    Denies any difficulty with starting or stopping his urinary stream or dysuria.    For more than 20 years he will get intermittent intercostal pain with movement or bending over and twisting motions.  He denies any injury, shortness of breath, chest pain, or any other associated symptoms.    Regarding prediabetes, he has greatly reduced intake of sugar and rarely eats fruit.     Objective   Vital Signs:   Vitals:    08/08/23 1249   BP: 136/74   BP Location: Left arm   Patient Position: Sitting   Cuff Size: Large Adult   Pulse: 93   SpO2: 95%   Weight: (!) 143 kg (315 lb)   Height: 179.7 cm (70.75\")      Body mass index is 44.24 kg/mý.             Physical Exam  Vitals reviewed.   Constitutional:       General: He is not in acute distress.     Appearance: Normal appearance. He is well-developed. He is obese.   Cardiovascular:      Rate and Rhythm: Normal rate and regular rhythm.      Heart sounds: Normal heart sounds.   Pulmonary:      Effort: Pulmonary effort is normal.      Breath sounds: Normal breath sounds. "   Musculoskeletal:         General: Normal range of motion.      Right lower leg: No edema.      Left lower leg: No edema.   Skin:     General: Skin is warm and dry.   Neurological:      General: No focal deficit present.      Mental Status: He is alert.   Psychiatric:         Attention and Perception: Attention normal.         Mood and Affect: Mood and affect normal.         Behavior: Behavior normal.         Current Outpatient Medications:     aspirin 81 MG EC tablet, aspirin 81 mg oral tablet,delayed release (DR/EC) take 1 tablet (81 mg) by oral route once daily   Active, Disp: , Rfl:     atorvastatin (LIPITOR) 10 MG tablet, Take 1 tablet by mouth Daily., Disp: 90 tablet, Rfl: 1    B Complex Vitamins (B COMPLEX PO), Take  by mouth Daily., Disp: , Rfl:     Coenzyme Q10 (CO Q 10 PO), Take 200 mg by mouth Daily., Disp: , Rfl:     fexofenadine (ALLEGRA) 180 MG tablet, Take 1 tablet by mouth Daily., Disp: 90 tablet, Rfl: 1    lisinopril (PRINIVIL,ZESTRIL) 10 MG tablet, Take 1 tablet by mouth Daily., Disp: 90 tablet, Rfl: 1    omeprazole (priLOSEC) 40 MG capsule, Take 1 capsule by mouth Daily., Disp: 90 capsule, Rfl: 1    vitamin D3 125 MCG (5000 UT) capsule capsule, Take  by mouth Daily., Disp: , Rfl:     Zinc 50 MG tablet, Take  by mouth Daily., Disp: , Rfl:    Past Medical History:   Diagnosis Date    Allergy     SULFONAMIDES  PHENOBARBIAL NEOSPORIN  HYDROGEN PEROXIDE   MODERATE    Dizziness and giddiness     Edema, unspecified     Erosive gastritis     Fracture     L FOOT    GERD without esophagitis     Mixed hyperlipidemia     Other specified nontoxic goiter     Pulmonary embolism     PERSONAL /O PULMONARY EMBOLISM    Pulmonary embolism 05/31/2016    HOSPITALIZED     Allergies   Allergen Reactions    Bacitracin-Polymyxin B Other (See Comments)    Statins Other (See Comments)    Hydrogen Peroxide Other (See Comments)     Irritation to area applied    Stan-Bacit-Poly-Lidocaine Other (See Comments)     Irritation to  area applied    Phenobarbital Other (See Comments)     Night pittman    Sulfa Antibiotics Nausea Only           Result Review :     CMP          9/6/2022    13:52 3/7/2023    14:22   CMP   Glucose 123  89    BUN 13  14    Creatinine 1.03  1.07    EGFR 80.1  76.1    Sodium 138  137    Potassium 4.2  4.3    Chloride 104  102    Calcium 9.0  9.3    Total Protein 7.3  7.5    Albumin 3.70  3.5    Globulin 3.6  4.0    Total Bilirubin 0.4  0.4    Alkaline Phosphatase 65  62    AST (SGOT) 23  26    ALT (SGPT) 33  30    Albumin/Globulin Ratio 1.0  0.9    BUN/Creatinine Ratio 12.6  13.1    Anion Gap 11.6  13.6      CBC          9/6/2022    13:52   CBC   WBC 11.05    RBC 5.01    Hemoglobin 15.4    Hematocrit 48.4    MCV 96.6    MCH 30.7    MCHC 31.8    RDW 13.6    Platelets 201      CBC w/diff          9/6/2022    13:52   CBC w/Diff   WBC 11.05    RBC 5.01    Hemoglobin 15.4    Hematocrit 48.4    MCV 96.6    MCH 30.7    MCHC 31.8    RDW 13.6    Platelets 201    Neutrophil Rel % 70.1    Immature Granulocyte Rel % 0.4    Lymphocyte Rel % 18.5    Monocyte Rel % 9.7    Eosinophil Rel % 0.8    Basophil Rel % 0.5      Lipid Panel          9/6/2022    13:52 3/7/2023    14:22   Lipid Panel   Total Cholesterol 138  137    Triglycerides 109  119    HDL Cholesterol 42  41    VLDL Cholesterol 20  22    LDL Cholesterol  76  74    LDL/HDL Ratio 1.77  1.76        Most Recent A1C          3/7/2023    14:22   HGBA1C Most Recent   Hemoglobin A1C 5.90               Assessment and Plan    Diagnoses and all orders for this visit:    1. Gastritis determined by endoscopy (Primary)  -     omeprazole (priLOSEC) 40 MG capsule; Take 1 capsule by mouth Daily.  Dispense: 90 capsule; Refill: 1    2. Mixed hyperlipidemia  -     Lipid panel; Future  -     atorvastatin (LIPITOR) 10 MG tablet; Take 1 tablet by mouth Daily.  Dispense: 90 tablet; Refill: 1    3. Prediabetes  -     Hemoglobin A1c; Future    4. Essential hypertension  Assessment & Plan:  Monitor  blood pressure at home report any elevated readings.  Follow-up in 6 months or sooner if concerns.    Orders:  -     Comprehensive metabolic panel; Future  -     lisinopril (PRINIVIL,ZESTRIL) 10 MG tablet; Take 1 tablet by mouth Daily.  Dispense: 90 tablet; Refill: 1    5. Arthralgia, unspecified joint  Assessment & Plan:  Continue with rheumatology.      6. Prostate cancer screening  -     PSA SCREENING; Future    7. Allergic rhinitis, unspecified seasonality, unspecified trigger  -     fexofenadine (ALLEGRA) 180 MG tablet; Take 1 tablet by mouth Daily.  Dispense: 90 tablet; Refill: 1    8. Intercostal pain  Assessment & Plan:  Surgery and that the symptoms happen only with movement I suspect is more musculoskeletal in origin.  Offered to get x-ray of the chest with rib detail.  Patient defers at this time and will consider having this done in the future if persist          Follow Up    No follow-ups on file.  Patient was given instructions and counseling regarding his condition or for health maintenance advice. Please see specific information pulled into the AVS if appropriate.

## 2023-08-08 NOTE — ASSESSMENT & PLAN NOTE
Monitor blood pressure at home report any elevated readings.  Follow-up in 6 months or sooner if concerns.

## 2023-08-08 NOTE — ASSESSMENT & PLAN NOTE
Surgery and that the symptoms happen only with movement I suspect is more musculoskeletal in origin.  Offered to get x-ray of the chest with rib detail.  Patient defers at this time and will consider having this done in the future if persist

## 2023-08-09 LAB
ALBUMIN SERPL-MCNC: 3.8 G/DL (ref 3.5–5.2)
ALBUMIN/GLOB SERPL: 1.1 G/DL
ALP SERPL-CCNC: 52 U/L (ref 39–117)
ALT SERPL W P-5'-P-CCNC: 28 U/L (ref 1–41)
ANION GAP SERPL CALCULATED.3IONS-SCNC: 14 MMOL/L (ref 5–15)
AST SERPL-CCNC: 26 U/L (ref 1–40)
BILIRUB SERPL-MCNC: 0.6 MG/DL (ref 0–1.2)
BUN SERPL-MCNC: 14 MG/DL (ref 8–23)
BUN/CREAT SERPL: 13 (ref 7–25)
CALCIUM SPEC-SCNC: 9.1 MG/DL (ref 8.6–10.5)
CHLORIDE SERPL-SCNC: 103 MMOL/L (ref 98–107)
CHOLEST SERPL-MCNC: 151 MG/DL (ref 0–200)
CO2 SERPL-SCNC: 20 MMOL/L (ref 22–29)
CREAT SERPL-MCNC: 1.08 MG/DL (ref 0.76–1.27)
EGFRCR SERPLBLD CKD-EPI 2021: 75.2 ML/MIN/1.73
GLOBULIN UR ELPH-MCNC: 3.5 GM/DL
GLUCOSE SERPL-MCNC: 100 MG/DL (ref 65–99)
HDLC SERPL-MCNC: 39 MG/DL (ref 40–60)
LDLC SERPL CALC-MCNC: 95 MG/DL (ref 0–100)
LDLC/HDLC SERPL: 2.41 {RATIO}
POTASSIUM SERPL-SCNC: 4.3 MMOL/L (ref 3.5–5.2)
PROT SERPL-MCNC: 7.3 G/DL (ref 6–8.5)
PSA SERPL-MCNC: 0.56 NG/ML (ref 0–4)
SODIUM SERPL-SCNC: 137 MMOL/L (ref 136–145)
TRIGL SERPL-MCNC: 91 MG/DL (ref 0–150)
VLDLC SERPL-MCNC: 17 MG/DL (ref 5–40)

## 2023-08-10 NOTE — PROGRESS NOTES
Prostate cancer screening test is negative.  Lipid panel looks good.  You are not diabetic.  Prediabetes is stable to improved.  Kidney and liver function are normal.

## 2023-09-05 ENCOUNTER — TELEPHONE (OUTPATIENT)
Dept: FAMILY MEDICINE CLINIC | Age: 68
End: 2023-09-05

## 2023-09-05 NOTE — TELEPHONE ENCOUNTER
Please be informed that patient has passed. Patient has been marked  in the system. The date of death is: 23    Caller: Marietta Pabon    Relationship: Emergency Contact    Best call back number:     [phone]

## 2023-12-21 NOTE — ASSESSMENT & PLAN NOTE
Courage healthy diet and exercise.  Declines shingles, flu, and pneumonia vaccination.  Declines need for prostate exam.   Warm

## 2024-12-01 NOTE — PROGRESS NOTES
Problem: DISCHARGE PLANNING  Goal: Discharge to home or other facility with appropriate resources  Description: INTERVENTIONS:  - Identify barriers to discharge w/patient and caregiver  - Arrange for needed discharge resources and transportation as appropriate  - Identify discharge learning needs (meds, wound care, etc.)  - Arrange for interpretive services to assist at discharge as needed  - Refer to Case Management Department for coordinating discharge planning if the patient needs post-hospital services based on physician/advanced practitioner order or complex needs related to functional status, cognitive ability, or social support system  Outcome: Progressing     Problem: RESPIRATORY - ADULT  Goal: Achieves optimal ventilation and oxygenation  Description: INTERVENTIONS:  - Assess for changes in respiratory status  - Assess for changes in mentation and behavior  - Position to facilitate oxygenation and minimize respiratory effort  - Oxygen administered by appropriate delivery if ordered  - Initiate smoking cessation education as indicated  - Encourage broncho-pulmonary hygiene including cough, deep breathe, Incentive Spirometry  - Assess the need for suctioning and aspirate as needed  - Assess and instruct to report SOB or any respiratory difficulty  - Respiratory Therapy support as indicated  Outcome: Progressing     Problem: COPING  Goal: Will report anxiety at manageable levels  Description: INTERVENTIONS:  - Administer medication as ordered  - Teach and encourage coping skills  - Provide emotional support  - Assess patient/family for anxiety and ability to cope  Outcome: Progressing      Cm Yemi L. 1955     Office/Outpatient Visit    Visit Date: Wed, Feb 14, 2018 03:02 pm    Provider: Jyoti Mcduffie N.P. (Assistant: Cristina Perez MA)    Location: [facility]        Electronically signed by Jyoti Mcduffie N.P. on  02/15/2018 09:01:40 AM                             SUBJECTIVE:        CC:     Fabian is a 62 year old White male.  Patient complains of stomach pain and vomiting.          HPI:         Patient to be evaluated for vomiting.      nausea and vomiting Last episode at 5 hrs ago There is no clear relationship between the symptoms and meals.  x 3 this am.  denies diarrhea.  feels better.  he and wife state he gets intermittent bouts of vomiting that resolve same day.  last episode was 2 wks ago.  intermittnet x 6 months.  afebrile.  hx h pylori but does not feel as significant as those symptoms were previously.  epigastric discomfort.      ROS:     CONSTITUTIONAL:  Negative for chills, fatigue, fever, and weight change.      E/N/T:  Negative for hearing problems, E/N/T pain, congestion, rhinorrhea, epistaxis, hoarseness, and dental problems.      CARDIOVASCULAR:  Negative for chest pain, palpitations, tachycardia, orthopnea, and edema.      RESPIRATORY:  Negative for cough, dyspnea, and hemoptysis.      GASTROINTESTINAL:  Positive for abdominal pain ( epigastric ), nausea and vomiting.   Negative for acid reflux symptoms, constipation, diarrhea, heartburn or hematochezia.      GENITOURINARY:  Negative for dysuria.      MUSCULOSKELETAL:  Negative for arthralgias, back pain, and myalgias.      NEUROLOGICAL:  Negative for dizziness, headaches, paresthesias, and weakness.      PSYCHIATRIC:  Positive for feelings of stress ( (occupational stress but not consistent per pt) ).   Negative for anxiety, depression or sleep disturbance.          PMH/FMH/SH:     Last Reviewed on 1/23/2018 10:41 AM by Vahe Jones    Past Medical History:         Fracture(s): left foot fx;          PREVENTIVE HEALTH MAINTENANCE             COLONOSCOPY: Done within the last 10 years was last done 1/2008 with normal results The next one is due  at age 65     INFLUENZA VACCINE: declines, understands reason for immunization         Surgical History:     NONE         Family History:         Positive for Coronary Artery Disease ( father ).      Positive for Type 2 Diabetes ( mother ).          Social History:     Occupation: a factory     Marital Status:      Children: 3 children     ALONSO denies any current form of exercise.          Tobacco/Alcohol/Supplements:     Last Reviewed on 2/14/2018 03:07 PM by Cristina Perez    Tobacco: He has never smoked.  Non-drinker     Caffeine:  He admits to consuming caffeine via coffee ( 3 servings per day ) and tea ( 1 serving per day ).          Substance Abuse History:     Last Reviewed on 1/23/2018 10:41 AM by Vahe Jones    NEGATIVE         Mental Health History:     Last Reviewed on 1/23/2018 10:41 AM by Vahe Jones        Communicable Diseases (eg STDs):     Last Reviewed on 1/23/2018 10:41 AM by Vahe Jones            Current Problems:     Last Reviewed on 1/23/2018 10:41 AM by Vahe Jones    GERD     Hypercholesterolemia     Fatigue     History of pulmonary embolism     Thyromegaly     Headache     Acute sinusitis, other         Immunizations:     Influenza Virus Vaccine, unspecified formulation 2/14/2017         Allergies:     Last Reviewed on 2/14/2018 03:07 PM by Cristina Perez    Sulfonamides:    Phenobarbital:    Neosporin:    hydrogen peroxide:        Current Medications:     Last Reviewed on 2/14/2018 03:07 PM by Cristina Perez    Allegra Allergy 24 Hour 180mg Tablet Take 1 tablet(s) by mouth daily     Lipitor 10mg Tablet Take 1 tablet(s) by mouth daily     Omeprazole 40mg Capsules, Extended Release Take 1 capsule(s) by mouth daily     Aspirin (ASA) 81mg Tablets, Enteric Coated 1 tab daily     Erythromycin Base 333mg  Tablets, Enteric Coated Take 1 tablet(s) by mouth q8h     Fluticasone Propionate 50mcg/1actuation Nasal Spray 1 spray each nostil daily         OBJECTIVE:        Vitals:         Historical:     01/31/2018  BP:   142/88 mm Hg ( (left arm, , sitting, );)     01/31/2018  Wt:   255lbs        Current: 2/14/2018 3:07:09 PM    Ht:  5 ft, 10.75 in;  Wt: 256.1 lbs;  BMI: 36.0    T: 98.8 F (oral);  BP: 130/72 mm Hg (left arm, sitting);  P: 76 bpm (left arm (BP Cuff), sitting);  sCr: 0.89 mg/dL;  GFR: 97.73        Exams:     PHYSICAL EXAM:     GENERAL: no apparent distress;     RESPIRATORY: normal respiratory rate and pattern with no distress; normal breath sounds with no rales, rhonchi, wheezes or rubs;     CARDIOVASCULAR: normal rate; rhythm is regular;     GASTROINTESTINAL: nontender; normal bowel sounds;     MUSCULOSKELETAL:  Normal range of motion, strength and tone;     NEUROLOGIC: mental status: alert and oriented x 3; GROSSLY INTACT     PSYCHIATRIC:  appropriate affect and demeanor; normal speech pattern; grossly normal memory;         ASSESSMENT           536.2   R11.10  Vomiting              DDx:         ORDERS:         Lab Orders:       [id number]  BDCB2 - H CBC w/o diff  (Send-Out)         16666  COMP - [facility] Comp. Metabolic Panel  (Send-Out)         42813  HPUBT - H H.pylori Breath test  (Send-Out)                   PLAN:          Vomiting     LABORATORY:  Labs ordered to be performed today include CBC W/O DIFF, Comprehensive metabolic panel, and H.pylori urea breath test ([facility]).      RECOMMENDATIONS given include: get plenty of rest, maintain a clear liquid diet, resume intake of solid foods gradually, possible gastroenteritis.  is seeing dr cuello next week for colonoscopy.  is going to talk to dr cuello about upper scope as well.  that is reasonable to me., and Go to the ER if worse.            Orders:       [id number]  BDCB2 - [facility] CBC w/o diff  (Send-Out)         75010  COMP - H Comp. Metabolic Panel  (Send-Out)          05230  [facility] H.pylori Breath test  (Send-Out)               Patient Recommendations:        For  Vomiting:     Get plenty of rest.     Maintain a diet consisting of clear liquids (clear beverages, broth, gelatin, flavored ices, pediatric electrolyte solutions, sports drinks, etc.).  Avoid solid foods or formula. After 4-8 hours have passed without vomiting, you may gradually advance your diet to include bland foods, such as saltine crackers or bread, or dilute formula.              CHARGE CAPTURE           **Please note: ICD descriptions below are intended for billing purposes only and may not represent clinical diagnoses**        Primary Diagnosis:         536.2 Vomiting            R11.10    Vomiting, unspecified              Orders:          [id number]   Office/outpatient visit; established patient, level 3  (In-House)